# Patient Record
Sex: FEMALE | Race: WHITE | NOT HISPANIC OR LATINO | Employment: OTHER | URBAN - NONMETROPOLITAN AREA
[De-identification: names, ages, dates, MRNs, and addresses within clinical notes are randomized per-mention and may not be internally consistent; named-entity substitution may affect disease eponyms.]

---

## 2017-08-23 ENCOUNTER — HOSPITAL ENCOUNTER (OUTPATIENT)
Dept: ULTRASOUND IMAGING | Facility: HOSPITAL | Age: 62
Discharge: HOME | End: 2017-08-23
Attending: OBSTETRICS & GYNECOLOGY

## 2017-08-23 ENCOUNTER — HOSPITAL ENCOUNTER (OUTPATIENT)
Dept: MAMMOGRAPHY | Facility: HOSPITAL | Age: 62
Discharge: HOME | End: 2017-08-23
Attending: OBSTETRICS & GYNECOLOGY | Admitting: OBSTETRICS & GYNECOLOGY

## 2017-08-23 ENCOUNTER — TRANSCRIBE ORDERS (OUTPATIENT)
Dept: ADMINISTRATIVE | Facility: HOSPITAL | Age: 62
End: 2017-08-23

## 2017-08-23 DIAGNOSIS — N64.4 BREAST PAIN: ICD-10-CM

## 2017-08-23 DIAGNOSIS — N63.0 BREAST LUMP: ICD-10-CM

## 2017-08-23 DIAGNOSIS — N63.0 BREAST LUMP: Primary | ICD-10-CM

## 2017-08-23 PROCEDURE — 76642 ULTRASOUND BREAST LIMITED: CPT

## 2017-08-23 PROCEDURE — G0279 TOMOSYNTHESIS, MAMMO: HCPCS

## 2017-08-23 PROCEDURE — G0206 DX MAMMO INCL CAD UNI: HCPCS

## 2018-04-25 ENCOUNTER — DOCUMENTATION (OUTPATIENT)
Dept: CARDIOLOGY | Facility: CLINIC | Age: 63
End: 2018-04-25

## 2018-04-25 ENCOUNTER — OFFICE VISIT (OUTPATIENT)
Dept: CARDIOLOGY | Facility: CLINIC | Age: 63
End: 2018-04-25

## 2018-04-25 VITALS
WEIGHT: 169 LBS | BODY MASS INDEX: 24.2 KG/M2 | SYSTOLIC BLOOD PRESSURE: 120 MMHG | DIASTOLIC BLOOD PRESSURE: 80 MMHG | HEART RATE: 65 BPM | HEIGHT: 70 IN

## 2018-04-25 DIAGNOSIS — R00.2 PALPITATION: ICD-10-CM

## 2018-04-25 DIAGNOSIS — I10 ESSENTIAL HYPERTENSION: ICD-10-CM

## 2018-04-25 DIAGNOSIS — G89.29 CHRONIC MIDLINE LOW BACK PAIN WITHOUT SCIATICA: ICD-10-CM

## 2018-04-25 DIAGNOSIS — I77.819 AORTIC DILATATION (HCC): Primary | ICD-10-CM

## 2018-04-25 DIAGNOSIS — M54.50 CHRONIC MIDLINE LOW BACK PAIN WITHOUT SCIATICA: ICD-10-CM

## 2018-04-25 DIAGNOSIS — R00.0 TACHYCARDIA: ICD-10-CM

## 2018-04-25 DIAGNOSIS — R06.09 DOE (DYSPNEA ON EXERTION): ICD-10-CM

## 2018-04-25 PROCEDURE — 99204 OFFICE O/P NEW MOD 45 MIN: CPT | Performed by: INTERNAL MEDICINE

## 2018-04-25 PROCEDURE — 93000 ELECTROCARDIOGRAM COMPLETE: CPT | Performed by: INTERNAL MEDICINE

## 2018-04-25 RX ORDER — METOPROLOL SUCCINATE 25 MG/1
25 TABLET, EXTENDED RELEASE ORAL DAILY
Refills: 5 | COMMUNITY
Start: 2018-04-12 | End: 2018-12-18 | Stop reason: SDUPTHER

## 2018-04-25 NOTE — PROGRESS NOTES
Wanda Victoria  [id number]  1955  63 y.o.  female    Referring Provider: Micah Morton MD    Reason for Follow-up Visit:  Initial visit for evaluation for aortic root dilatation 4.2 cm [place]       Subjective     Chest pain with exertion, mild substernal, pressure like. Lasts less than 1 minute  Started 1 year ago  Occurs once or twice a week  No associated diaphoresis  No associated nausea  No radiation  Precipitated with exertion  Relieved with rest  Not positional  No change with intake of food or antacids  No change with breathing  Mild associated dyspnea    Mild chronic exertional shortness of breath on exertion relieved with rest  No significant cough or wheezing  Going on for several months  Occasional palpitations  No associated chest pain  No significant pedal edema  No fever or chills  No significant expectoration  No hemoptysis  No presyncope or syncope   Chronic low back pain        History of present illness:  Wanda Victoria is a 63 y.o. yo female with history of aortic root dilatation  who presents today for   Chief Complaint   Patient presents with   • AORTIC ROOT DILATION     NEW PT    • Chest Pain     PINCHING IN CHEST   • Fatigue   • Shortness of Breath     WITH EXCERTION    .    History  Past Medical History:   Diagnosis Date   • Hypertension    • Lyme disease    • Tachycardia    ,   Past Surgical History:   Procedure Laterality Date   • BACK SURGERY      DR MONTESINOS   • BREAST BIOPSY     •  SECTION     ,   Family History   Problem Relation Age of Onset   • Heart disease Mother    • Heart disease Father    ,   Social History   Substance Use Topics   • Smoking status: Never Smoker   • Smokeless tobacco: Never Used   • Alcohol use No   ,     Medications  Current Outpatient Prescriptions   Medication Sig Dispense Refill   • metoprolol succinate XL (TOPROL-XL) 25 MG 24 hr tablet Take 25 mg by mouth Daily.  5     No current facility-administered medications for this visit.   "      Allergies:  Sulfa antibiotics    Review of Systems  Review of Systems   Constitution: Positive for weakness and malaise/fatigue.   Cardiovascular: Positive for chest pain and dyspnea on exertion.   Musculoskeletal: Positive for arthritis and back pain.       Objective     Physical Exam:  /80   Pulse 65   Ht 177.8 cm (70\")   Wt 76.7 kg (169 lb)   BMI 24.25 kg/m²   Physical Exam   Constitutional: She appears well-developed.   HENT:   Head: Normocephalic.   Neck: Normal carotid pulses and no JVD present. No tracheal tenderness present. Carotid bruit is not present. No tracheal deviation and no edema present.   Cardiovascular: Regular rhythm, normal heart sounds and normal pulses.    Pulmonary/Chest: Effort normal. No stridor.   Abdominal: Soft.   Neurological: She is alert. She has normal strength. No cranial nerve deficit or sensory deficit.   Skin: Skin is warm.   Psychiatric: She has a normal mood and affect. Her speech is normal and behavior is normal.       Results Review:       ECG 12 Lead  Date/Time: 4/25/2018 11:26 AM  Performed by: SIMÓN BLACK  Authorized by: SIMÓN BLACK   Comparison: not compared with previous ECG   Rhythm: sinus rhythm  Rate: normal  Conduction: conduction normal  ST Segments: ST segments normal  T Waves: T waves normal  QRS axis: normal  Other: no other findings  Clinical impression: normal ECG            Assessment/Plan   Wanda was seen today for aortic root dilation, chest pain, fatigue and shortness of breath.    Diagnoses and all orders for this visit:    Aortic dilatation  -     Holter Monitor - 24 Hour; Future  -     CT Chest With Contrast; Future  -     US AAA Screen Limited; Future    Tachycardia    Essential hypertension    Palpitation    Chronic midline low back pain without sciatica    PERERA (dyspnea on exertion)  -     Adult Transthoracic Echo Complete W/ Cont if Necessary Per Protocol; Future  -     Stress Test With Myocardial Perfusion One Day; Future    Other " "orders  -     ECG 12 Lead          Plan:    Low salt/ HTN/ Heart healthy carbohydrate restricted cardiac diet as applicable to this patient's current diagnoses.   This handout has relevant information regarding shopping for food, preparing meals, what to eat at restaurants, tracking of food intake, information regarding sodium intake and salt content, how to read food labels, knowing what to eat, tips reagarding physical activity, calorie count and calorie expenditure. What foods to avoid. Information regarding alcoholic drinks along with \"good\" and \"bad\" fats.  Relevant printed educational materials given pertinent to above diagnoses      Keep LDL below 70 mg/dl. Monitor liver and renal functions.   Monitor CBC, CMP and Lipid Panel by primary    The patient is advised to reduce or avoid caffeine or other cardiac stimulants.     The patient was advised that NSAID-type medications have three  very important potential side effects: cardiovascular complications, gastrointestinal irritation including hemorrhage and renal injuries.  Do not use anti-inflammatories such as Motrin/ibuprofen, Alleve/naprosyn, Mobic and like medications Use Tylenol instead.The patient expresses understanding of these issues and questions were answered.   Monitor for any signs of bleeding including red or dark stools. Fall precautions.       Orders Placed This Encounter   Procedures   • CT Chest With Contrast     Standing Status:   Future     Standing Expiration Date:   4/25/2019   • US AAA Screen Limited     Standing Status:   Future     Standing Expiration Date:   4/25/2019     Order Specific Question:   Is the patient male between 65-75 years old?   The screening exam is only eligible for Medicare patients that are male, between 65-75 years old, have a family history of AAA or has smoked at least 100 cigarettes in their lifetime.     Answer:   No     Order Specific Question:   Reason for Exam:     Answer:   dilated aorta   • Holter Monitor " - 24 Hour     Standing Status:   Future     Standing Expiration Date:   4/25/2019     Order Specific Question:   Reason for exam?     Answer:   Palpitations   • Stress Test With Myocardial Perfusion One Day     Standing Status:   Future     Standing Expiration Date:   4/25/2019     Order Specific Question:   What stress agent will be used?     Answer:   Regadenoson (Lexiscan)     Order Specific Question:   Difficulty walking criteria?     Answer:   Musculoskeletal (hips, knees, feet, back, amputee)     Order Specific Question:   Reason for exam?     Answer:   Chest Pain   • ECG 12 Lead     This order was created via procedure documentation   • Adult Transthoracic Echo Complete W/ Cont if Necessary Per Protocol     Standing Status:   Future     Standing Expiration Date:   4/25/2019     Order Specific Question:   Reason for exam?     Answer:   Chest Pain     Order Specific Question:   Reason for exam?     Answer:   Dyspnea            Return in about 6 weeks (around 6/6/2018).

## 2018-05-17 ENCOUNTER — HOSPITAL ENCOUNTER (OUTPATIENT)
Dept: CARDIOLOGY | Facility: HOSPITAL | Age: 63
Discharge: HOME | End: 2018-05-17
Attending: INTERNAL MEDICINE

## 2018-05-17 ENCOUNTER — HOSPITAL ENCOUNTER (OUTPATIENT)
Dept: CT IMAGING | Facility: HOSPITAL | Age: 63
Discharge: HOME | End: 2018-05-17
Attending: INTERNAL MEDICINE

## 2018-05-17 ENCOUNTER — HOSPITAL ENCOUNTER (OUTPATIENT)
Dept: ULTRASOUND IMAGING | Facility: HOSPITAL | Age: 63
Discharge: HOME | End: 2018-05-17
Attending: INTERNAL MEDICINE | Admitting: INTERNAL MEDICINE

## 2018-05-17 VITALS
WEIGHT: 165 LBS | DIASTOLIC BLOOD PRESSURE: 76 MMHG | HEIGHT: 71 IN | BODY MASS INDEX: 23.1 KG/M2 | SYSTOLIC BLOOD PRESSURE: 122 MMHG | HEART RATE: 63 BPM

## 2018-05-17 DIAGNOSIS — I77.819 AORTIC DILATATION (HCC): ICD-10-CM

## 2018-05-17 DIAGNOSIS — R06.09 DOE (DYSPNEA ON EXERTION): ICD-10-CM

## 2018-05-17 LAB — CREAT BLDA-MCNC: 0.9 MG/DL (ref 0.6–1.3)

## 2018-05-17 PROCEDURE — 78452 HT MUSCLE IMAGE SPECT MULT: CPT | Performed by: INTERNAL MEDICINE

## 2018-05-17 PROCEDURE — 78452 HT MUSCLE IMAGE SPECT MULT: CPT

## 2018-05-17 PROCEDURE — 71260 CT THORAX DX C+: CPT

## 2018-05-17 PROCEDURE — 82565 ASSAY OF CREATININE: CPT

## 2018-05-17 PROCEDURE — 93018 CV STRESS TEST I&R ONLY: CPT | Performed by: INTERNAL MEDICINE

## 2018-05-17 PROCEDURE — A9500 TC99M SESTAMIBI: HCPCS | Performed by: INTERNAL MEDICINE

## 2018-05-17 PROCEDURE — 25010000002 REGADENOSON 0.4 MG/5ML SOLUTION: Performed by: INTERNAL MEDICINE

## 2018-05-17 PROCEDURE — 25010000002 IOPAMIDOL 61 % SOLUTION: Performed by: INTERNAL MEDICINE

## 2018-05-17 PROCEDURE — 76706 US ABDL AORTA SCREEN AAA: CPT

## 2018-05-17 PROCEDURE — 0 TECHNETIUM SESTAMIBI: Performed by: INTERNAL MEDICINE

## 2018-05-17 PROCEDURE — 93306 TTE W/DOPPLER COMPLETE: CPT

## 2018-05-17 PROCEDURE — 93017 CV STRESS TEST TRACING ONLY: CPT

## 2018-05-17 PROCEDURE — 93306 TTE W/DOPPLER COMPLETE: CPT | Performed by: INTERNAL MEDICINE

## 2018-05-17 RX ADMIN — TECHNETIUM TC 99M SESTAMIBI 1 DOSE: 1 INJECTION INTRAVENOUS at 09:00

## 2018-05-17 RX ADMIN — REGADENOSON 0.4 MG: 0.08 INJECTION, SOLUTION INTRAVENOUS at 10:21

## 2018-05-17 RX ADMIN — IOPAMIDOL 100 ML: 612 INJECTION, SOLUTION INTRAVENOUS at 09:10

## 2018-05-17 RX ADMIN — TECHNETIUM TC 99M SESTAMIBI 1 DOSE: 1 INJECTION INTRAVENOUS at 10:28

## 2018-05-18 LAB
BH CV STRESS BP STAGE 1: NORMAL
BH CV STRESS COMMENTS STAGE 1: NORMAL
BH CV STRESS DOSE REGADENOSON STAGE 1: 0.4
BH CV STRESS DURATION MIN STAGE 1: 0
BH CV STRESS DURATION SEC STAGE 1: 10
BH CV STRESS HR STAGE 1: 91
BH CV STRESS PROTOCOL 1: NORMAL
BH CV STRESS RECOVERY BP: NORMAL MMHG
BH CV STRESS RECOVERY HR: 72 BPM
BH CV STRESS STAGE 1: 1
LV EF NUC BP: 65 %
MAXIMAL PREDICTED HEART RATE: 157 BPM
PERCENT MAX PREDICTED HR: 57.96 %
STRESS BASELINE BP: NORMAL MMHG
STRESS BASELINE HR: 63 BPM
STRESS PERCENT HR: 68 %
STRESS POST EXERCISE DUR SEC: 10 SEC
STRESS POST PEAK BP: NORMAL MMHG
STRESS POST PEAK HR: 91 BPM
STRESS TARGET HR: 133 BPM

## 2018-05-19 LAB
BH CV ECHO MEAS - AO MAX PG (FULL): 4.6 MMHG
BH CV ECHO MEAS - AO MAX PG: 7.6 MMHG
BH CV ECHO MEAS - AO MEAN PG (FULL): 3 MMHG
BH CV ECHO MEAS - AO MEAN PG: 4 MMHG
BH CV ECHO MEAS - AO ROOT AREA (BSA CORRECTED): 2.2
BH CV ECHO MEAS - AO ROOT AREA: 13.9 CM^2
BH CV ECHO MEAS - AO ROOT DIAM: 4.2 CM
BH CV ECHO MEAS - AO V2 MAX: 138 CM/SEC
BH CV ECHO MEAS - AO V2 MEAN: 93.2 CM/SEC
BH CV ECHO MEAS - AO V2 VTI: 33.3 CM
BH CV ECHO MEAS - AVA(I,A): 2.1 CM^2
BH CV ECHO MEAS - AVA(I,D): 2.1 CM^2
BH CV ECHO MEAS - AVA(V,A): 2 CM^2
BH CV ECHO MEAS - AVA(V,D): 2 CM^2
BH CV ECHO MEAS - BSA(HAYCOCK): 2 M^2
BH CV ECHO MEAS - BSA: 1.9 M^2
BH CV ECHO MEAS - BZI_BMI: 24.2 KILOGRAMS/M^2
BH CV ECHO MEAS - BZI_METRIC_HEIGHT: 177.8 CM
BH CV ECHO MEAS - BZI_METRIC_WEIGHT: 76.7 KG
BH CV ECHO MEAS - CONTRAST EF 4CH: 60.5 ML/M^2
BH CV ECHO MEAS - EDV(CUBED): 73 ML
BH CV ECHO MEAS - EDV(MOD-SP4): 74.6 ML
BH CV ECHO MEAS - EDV(TEICH): 77.7 ML
BH CV ECHO MEAS - EF(CUBED): 69.3 %
BH CV ECHO MEAS - EF(MOD-SP4): 60.5 %
BH CV ECHO MEAS - EF(TEICH): 61.3 %
BH CV ECHO MEAS - ESV(CUBED): 22.4 ML
BH CV ECHO MEAS - ESV(MOD-SP4): 29.5 ML
BH CV ECHO MEAS - ESV(TEICH): 30.1 ML
BH CV ECHO MEAS - FS: 32.5 %
BH CV ECHO MEAS - IVS/LVPW: 1
BH CV ECHO MEAS - IVSD: 0.73 CM
BH CV ECHO MEAS - LA DIMENSION: 3 CM
BH CV ECHO MEAS - LA/AO: 0.71
BH CV ECHO MEAS - LAT PEAK E' VEL: 9 CM/SEC
BH CV ECHO MEAS - LV DIASTOLIC VOL/BSA (35-75): 38.4 ML/M^2
BH CV ECHO MEAS - LV MASS(C)D: 87.1 GRAMS
BH CV ECHO MEAS - LV MASS(C)DI: 44.8 GRAMS/M^2
BH CV ECHO MEAS - LV MAX PG: 3 MMHG
BH CV ECHO MEAS - LV MEAN PG: 1 MMHG
BH CV ECHO MEAS - LV SYSTOLIC VOL/BSA (12-30): 15.2 ML/M^2
BH CV ECHO MEAS - LV V1 MAX: 86.5 CM/SEC
BH CV ECHO MEAS - LV V1 MEAN: 55.9 CM/SEC
BH CV ECHO MEAS - LV V1 VTI: 22 CM
BH CV ECHO MEAS - LVIDD: 4.2 CM
BH CV ECHO MEAS - LVIDS: 2.8 CM
BH CV ECHO MEAS - LVLD AP4: 8 CM
BH CV ECHO MEAS - LVLS AP4: 6.8 CM
BH CV ECHO MEAS - LVOT AREA (M): 3.1 CM^2
BH CV ECHO MEAS - LVOT AREA: 3.1 CM^2
BH CV ECHO MEAS - LVOT DIAM: 2 CM
BH CV ECHO MEAS - LVPWD: 0.71 CM
BH CV ECHO MEAS - MR ALIAS VEL: 38.5 CM/SEC
BH CV ECHO MEAS - MR ERO: 0.05 CM^2
BH CV ECHO MEAS - MR FLOW RATE: 38.7 CM^3/SEC
BH CV ECHO MEAS - MR MAX PG: 219.6 MMHG
BH CV ECHO MEAS - MR MAX VEL: 741 CM/SEC
BH CV ECHO MEAS - MR MEAN PG: 143 MMHG
BH CV ECHO MEAS - MR MEAN VEL: 565 CM/SEC
BH CV ECHO MEAS - MR PISA RADIUS: 0.4 CM
BH CV ECHO MEAS - MR PISA: 1 CM^2
BH CV ECHO MEAS - MR VOLUME: 14.8 ML
BH CV ECHO MEAS - MR VTI: 284 CM
BH CV ECHO MEAS - MV A MAX VEL: 76.2 CM/SEC
BH CV ECHO MEAS - MV DEC SLOPE: 262 CM/SEC^2
BH CV ECHO MEAS - MV DEC TIME: 0.12 SEC
BH CV ECHO MEAS - MV E MAX VEL: 92.6 CM/SEC
BH CV ECHO MEAS - MV E/A: 1.2
BH CV ECHO MEAS - MV P1/2T MAX VEL: 99.5 CM/SEC
BH CV ECHO MEAS - MV P1/2T: 111.2 MSEC
BH CV ECHO MEAS - MVA P1/2T LCG: 2.2 CM^2
BH CV ECHO MEAS - MVA(P1/2T): 2 CM^2
BH CV ECHO MEAS - PI END-D VEL: 99.1 CM/SEC
BH CV ECHO MEAS - RAP SYSTOLE: 5 MMHG
BH CV ECHO MEAS - RVSP: 34.2 MMHG
BH CV ECHO MEAS - SI(AO): 237.4 ML/M^2
BH CV ECHO MEAS - SI(CUBED): 26 ML/M^2
BH CV ECHO MEAS - SI(LVOT): 35.6 ML/M^2
BH CV ECHO MEAS - SI(MOD-SP4): 23.2 ML/M^2
BH CV ECHO MEAS - SI(TEICH): 24.5 ML/M^2
BH CV ECHO MEAS - SV(AO): 461.4 ML
BH CV ECHO MEAS - SV(CUBED): 50.6 ML
BH CV ECHO MEAS - SV(LVOT): 69.1 ML
BH CV ECHO MEAS - SV(MOD-SP4): 45.1 ML
BH CV ECHO MEAS - SV(TEICH): 47.6 ML
BH CV ECHO MEAS - TR MAX VEL: 270 CM/SEC
LEFT ATRIUM VOLUME INDEX: 34.6 ML/M2
LV EF 2D ECHO EST: 55 %
MAXIMAL PREDICTED HEART RATE: 157 BPM
STRESS TARGET HR: 133 BPM

## 2018-06-12 ENCOUNTER — OFFICE VISIT (OUTPATIENT)
Dept: CARDIOLOGY | Facility: CLINIC | Age: 63
End: 2018-06-12

## 2018-06-12 VITALS — WEIGHT: 167.4 LBS | SYSTOLIC BLOOD PRESSURE: 124 MMHG | DIASTOLIC BLOOD PRESSURE: 81 MMHG | BODY MASS INDEX: 23.35 KG/M2

## 2018-06-12 DIAGNOSIS — I77.819 AORTIC DILATATION (HCC): ICD-10-CM

## 2018-06-12 DIAGNOSIS — M54.50 CHRONIC MIDLINE LOW BACK PAIN WITHOUT SCIATICA: ICD-10-CM

## 2018-06-12 DIAGNOSIS — G89.29 CHRONIC MIDLINE LOW BACK PAIN WITHOUT SCIATICA: ICD-10-CM

## 2018-06-12 DIAGNOSIS — I10 ESSENTIAL HYPERTENSION: Primary | ICD-10-CM

## 2018-06-12 DIAGNOSIS — R00.0 TACHYCARDIA: ICD-10-CM

## 2018-06-12 DIAGNOSIS — R00.2 PALPITATION: ICD-10-CM

## 2018-06-12 PROCEDURE — 93000 ELECTROCARDIOGRAM COMPLETE: CPT | Performed by: INTERNAL MEDICINE

## 2018-06-12 PROCEDURE — 99214 OFFICE O/P EST MOD 30 MIN: CPT | Performed by: INTERNAL MEDICINE

## 2018-06-12 RX ORDER — TRAMADOL HYDROCHLORIDE 50 MG/1
50 TABLET ORAL EVERY 6 HOURS PRN
Refills: 0 | COMMUNITY
Start: 2018-06-04 | End: 2020-08-10 | Stop reason: ALTCHOICE

## 2018-06-12 NOTE — PROGRESS NOTES
Wanda Victoria  [id number]  1955  63 y.o.  female    Referring Provider: Micah Morton MD    Reason for Follow-up Visit: Here for follow up after cardiac testing initial visit for evaluation for aortic root dilatation 4.2 cm [place]       Subjective    Overall feeling well   No chest pain or shortness of breath   No palpitations  No significant pedal edema  Compliant with medications and dietary advice  Good effort tolerance  No presyncope or syncope  Compliant with medications  Recent right tibial fracture  Low risk stress test with normal LVEF by echo cardiogram.   CT chest aorta 4 cm  Holter shows minor ectopy with sinus rhythm. No worrisome atrial or ventricular tachy or juan arrhythmia. No significant pause.      History of present illness:  Wanda Victoria is a 63 y.o. yo female with history of aortic root dilatation  who presents today for   Chief Complaint   Patient presents with   • Rapid Heart Rate     6 WEEK FOLLOW UP    • Palpitations   • Results   .    History  Past Medical History:   Diagnosis Date   • Asthma    • Hypertension    • Lyme disease    • Tachycardia    ,   Past Surgical History:   Procedure Laterality Date   • BACK SURGERY      DR MONTESINOS   • BREAST BIOPSY     •  SECTION     ,   Family History   Problem Relation Age of Onset   • Heart disease Mother    • Heart disease Father    ,   Social History   Substance Use Topics   • Smoking status: Never Smoker   • Smokeless tobacco: Never Used   • Alcohol use No   ,     Medications  Current Outpatient Prescriptions   Medication Sig Dispense Refill   • metoprolol succinate XL (TOPROL-XL) 25 MG 24 hr tablet Take 25 mg by mouth Daily. Patient is only taking half.  5   • traMADol (ULTRAM) 50 MG tablet Take 50 mg by mouth Every 6 (Six) Hours As Needed.  0     No current facility-administered medications for this visit.        Allergies:  Sulfa antibiotics    Review of Systems  Review of Systems   Constitution: Positive for weakness and  malaise/fatigue.   HENT: Negative.    Eyes: Negative.    Cardiovascular: Positive for dyspnea on exertion and palpitations. Negative for chest pain, claudication, cyanosis, irregular heartbeat, leg swelling, near-syncope, orthopnea, paroxysmal nocturnal dyspnea and syncope.   Respiratory: Negative.    Endocrine: Negative.    Hematologic/Lymphatic: Negative.    Skin: Negative.    Musculoskeletal: Positive for arthritis and back pain.   Gastrointestinal: Negative for anorexia.   Genitourinary: Negative.    Psychiatric/Behavioral: Negative.        Objective     Physical Exam:  /81 (BP Location: Left arm, Patient Position: Sitting, Cuff Size: Adult)   Wt 75.9 kg (167 lb 6.4 oz)   BMI 23.35 kg/m²   Physical Exam   Constitutional: She appears well-developed.   HENT:   Head: Normocephalic.   Neck: Normal carotid pulses and no JVD present. No tracheal tenderness present. Carotid bruit is not present. No tracheal deviation and no edema present.   Cardiovascular: Regular rhythm, normal heart sounds and normal pulses.    Pulmonary/Chest: Effort normal. No stridor.   Abdominal: Soft.   Neurological: She is alert. She has normal strength. No cranial nerve deficit or sensory deficit.   Skin: Skin is warm.   Psychiatric: She has a normal mood and affect. Her speech is normal and behavior is normal.       Results Review:    Results for orders placed during the hospital encounter of 05/17/18   Adult Transthoracic Echo Complete W/ Cont if Necessary Per Protocol    Narrative · Left ventricular systolic function is normal. Estimated EF = 55%.  · Left ventricular diastolic dysfunction.  · No evidence of pulmonary hypertension is present.      · Left ventricular ejection fraction is normal (Calculated EF = 65%).  · Myocardial perfusion imaging indicates a normal myocardial perfusion study with no evidence of ischemia.          Impressions are consistent with a low risk study.         · Average HR: 73. Min HR: 53. Max HR: 104.    "  · The predominant rhythm noted during the testing period was sinus rhythm.  · Premature atrial and ventricular contractions occured as below.  · Total ectopy burden during the monitoring period; PVCs:   1     and APCs: 53            · No significant supraventricular or ventricular tachy or juan arrhythmia.  · No correlated arrhythmia  · No significant pauses  · Entire report was reviewed    ECG 12 Lead  Date/Time: 6/12/2018 10:24 AM  Performed by: SIMÓN BLACK  Authorized by: SIMÓN BLACK   Comparison: compared with previous ECG from 4/25/2018  Comparison to previous ECG: atrial premature contractions   Rhythm: sinus rhythm  Ectopy: atrial premature contractions  Rate: normal  Conduction: conduction normal  ST Segments: ST segments normal  T Waves: T waves normal  QRS axis: normal  Other: no other findings  Clinical impression: abnormal ECG            Assessment/Plan   Wanda was seen today for rapid heart rate, palpitations and results.    Diagnoses and all orders for this visit:    Essential hypertension    Aortic dilatation    Palpitation    Tachycardia    Chronic midline low back pain without sciatica    Other orders  -     ECG 12 Lead          Plan:    Low salt/ HTN/ Heart healthy carbohydrate restricted cardiac diet as applicable to this patient's current diagnoses.   This handout has relevant information regarding shopping for food, preparing meals, what to eat at restaurants, tracking of food intake, information regarding sodium intake and salt content, how to read food labels, knowing what to eat, tips reagarding physical activity, calorie count and calorie expenditure. What foods to avoid. Information regarding alcoholic drinks along with \"good\" and \"bad\" fats.  Relevant printed educational materials given pertinent to above diagnoses      Keep LDL below 70 mg/dl. Monitor liver and renal functions.   Monitor CBC, CMP and Lipid Panel by primary    The patient is advised to reduce or avoid caffeine or " other cardiac stimulants.     The patient was advised that NSAID-type medications have three  very important potential side effects: cardiovascular complications, gastrointestinal irritation including hemorrhage and renal injuries.  Do not use anti-inflammatories such as Motrin/ibuprofen, Alleve/naprosyn, Mobic and like medications Use Tylenol instead.The patient expresses understanding of these issues and questions were answered.   Monitor for any signs of bleeding including red or dark stools. Fall precautions.       No additional cardiac testing required at this point in time.     Offered to give patient  a copy of my notes and relevant tests/ prior ECG etc for the patient to review and follow specific advise and relevant findings if any, prognosis, along with my current and future plans.         Return in about 6 months (around 12/12/2018).

## 2018-09-04 ENCOUNTER — TELEPHONE (OUTPATIENT)
Dept: CARDIOLOGY | Facility: CLINIC | Age: 63
End: 2018-09-04

## 2018-09-04 NOTE — TELEPHONE ENCOUNTER
Patient called and stated she had a bone scan done and it came back and it said negative 3.3 and [facility] would like her to start medication Reclast or Prolia  because she had broken her leg she is wanted to know if she should take them because she has a irregular heart beat her father and grandmother both  in aflutter.     Please advise what you think about her taking these medications she stated she doesn't want her irregular heart beat to get worse.

## 2018-09-05 NOTE — TELEPHONE ENCOUNTER
Called patient and let her know that  said it was okay to start one of the bone medications she asked about and if she had any heart related problems to give us a call.   Patient stated the understanding.

## 2018-10-11 ENCOUNTER — TRANSCRIBE ORDERS (OUTPATIENT)
Dept: ADMINISTRATIVE | Facility: HOSPITAL | Age: 63
End: 2018-10-11

## 2018-10-11 ENCOUNTER — HOSPITAL ENCOUNTER (OUTPATIENT)
Dept: MAMMOGRAPHY | Facility: HOSPITAL | Age: 63
Discharge: HOME | End: 2018-10-11
Attending: OBSTETRICS & GYNECOLOGY | Admitting: OBSTETRICS & GYNECOLOGY

## 2018-10-11 DIAGNOSIS — Z12.31 ENCOUNTER FOR SCREENING MAMMOGRAM FOR MALIGNANT NEOPLASM OF BREAST: Primary | ICD-10-CM

## 2018-10-11 DIAGNOSIS — Z12.31 ENCOUNTER FOR SCREENING MAMMOGRAM FOR MALIGNANT NEOPLASM OF BREAST: ICD-10-CM

## 2018-10-11 PROCEDURE — 77063 BREAST TOMOSYNTHESIS BI: CPT

## 2018-10-11 PROCEDURE — 77067 SCR MAMMO BI INCL CAD: CPT

## 2018-12-12 ENCOUNTER — OFFICE VISIT (OUTPATIENT)
Dept: CARDIOLOGY | Facility: CLINIC | Age: 63
End: 2018-12-12

## 2018-12-12 VITALS
DIASTOLIC BLOOD PRESSURE: 80 MMHG | HEIGHT: 71 IN | SYSTOLIC BLOOD PRESSURE: 132 MMHG | HEART RATE: 70 BPM | WEIGHT: 171 LBS | BODY MASS INDEX: 23.94 KG/M2 | OXYGEN SATURATION: 99 %

## 2018-12-12 DIAGNOSIS — R00.0 TACHYCARDIA: ICD-10-CM

## 2018-12-12 DIAGNOSIS — M54.50 CHRONIC MIDLINE LOW BACK PAIN WITHOUT SCIATICA: ICD-10-CM

## 2018-12-12 DIAGNOSIS — I10 ESSENTIAL HYPERTENSION: ICD-10-CM

## 2018-12-12 DIAGNOSIS — G89.29 CHRONIC MIDLINE LOW BACK PAIN WITHOUT SCIATICA: ICD-10-CM

## 2018-12-12 DIAGNOSIS — R00.2 PALPITATION: ICD-10-CM

## 2018-12-12 DIAGNOSIS — I77.819 AORTIC DILATATION (HCC): Primary | ICD-10-CM

## 2018-12-12 PROCEDURE — 93000 ELECTROCARDIOGRAM COMPLETE: CPT | Performed by: INTERNAL MEDICINE

## 2018-12-12 PROCEDURE — 99214 OFFICE O/P EST MOD 30 MIN: CPT | Performed by: INTERNAL MEDICINE

## 2018-12-12 NOTE — PROGRESS NOTES
Wanda Victoria  [id number]  1955  63 y.o.  female    Referring Provider: Micah Morton MD    Reason for Follow-up Visit: Here for follow up after cardiac testing initial visit for evaluation for aortic root dilatation 4.2 cm [place]   Recent RMSF says was not treated       Subjective      Similar symptoms as during last visit    Mild chronic exertional shortness of breath on exertion relieved with rest  No significant cough or wheezing  Going on for several months      Occasional palpitations, once every several weeks lasting for less than  2 minutes  Mild  dizziness, weakness,   No chest pain or shortness of breath     No associated chest pain  No significant pedal edema  No fever or chills  No significant expectoration  No hemoptysis    No presyncope or syncope   Compliant with medications  Prior right tibial fracture    Easy fatiguability         Testing    Low risk stress test with normal LVEF by echo cardiogram.   CT chest aorta 4 cm  Holter shows minor ectopy with sinus rhythm. No worrisome atrial or ventricular tachy or juan arrhythmia. No significant pause.      History of present illness:  Wanda Victoria is a 63 y.o. yo female with history of aortic root dilatation  who presents today for   Chief Complaint   Patient presents with   • Palpitations     6 MON FU    • Chest Pain     WHEN STRESSED FEELS LIKE A PINCH    .    History  Past Medical History:   Diagnosis Date   • Asthma    • Hypertension    • Lyme disease    • Tachycardia    ,   Past Surgical History:   Procedure Laterality Date   • BACK SURGERY      DR MONTESINOS   • BREAST BIOPSY Bilateral     several needle and one excisional bx   •  SECTION     ,   Family History   Problem Relation Age of Onset   • Heart disease Mother    • Heart disease Father    • Breast cancer Other    ,   Social History     Tobacco Use   • Smoking status: Never Smoker   • Smokeless tobacco: Never Used   Substance Use Topics   • Alcohol use: No   • Drug use: No   ,  "    Medications  Current Outpatient Medications   Medication Sig Dispense Refill   • Denosumab (PROLIA SC) Inject  under the skin into the appropriate area as directed.     • metoprolol succinate XL (TOPROL-XL) 25 MG 24 hr tablet Take 25 mg by mouth Daily. Patient is only taking half.  5   • traMADol (ULTRAM) 50 MG tablet Take 50 mg by mouth Every 6 (Six) Hours As Needed.  0     No current facility-administered medications for this visit.        Allergies:  Codeine and Sulfa antibiotics    Review of Systems  Review of Systems   Constitution: Positive for weakness and malaise/fatigue.   HENT: Negative.    Eyes: Negative.    Cardiovascular: Positive for dyspnea on exertion and palpitations. Negative for chest pain, claudication, cyanosis, irregular heartbeat, leg swelling, near-syncope, orthopnea, paroxysmal nocturnal dyspnea and syncope.   Respiratory: Negative.    Endocrine: Negative.    Hematologic/Lymphatic: Negative.    Skin: Negative.    Musculoskeletal: Positive for arthritis and back pain.   Gastrointestinal: Negative for anorexia.   Genitourinary: Negative.    Psychiatric/Behavioral: Negative.        Objective     Physical Exam:  /80   Pulse 70   Ht 180.3 cm (70.98\")   Wt 77.6 kg (171 lb)   SpO2 99%   BMI 23.86 kg/m²   Physical Exam   Constitutional: She appears well-developed.   HENT:   Head: Normocephalic.   Neck: Normal carotid pulses and no JVD present. No tracheal tenderness present. Carotid bruit is not present. No tracheal deviation and no edema present.   Cardiovascular: Regular rhythm, normal heart sounds and normal pulses.   Pulmonary/Chest: Effort normal. No stridor.   Abdominal: Soft.   Neurological: She is alert. She has normal strength. No cranial nerve deficit or sensory deficit.   Skin: Skin is warm.   Psychiatric: She has a normal mood and affect. Her speech is normal and behavior is normal.       Results Review:    Results for orders placed during the hospital encounter of " 05/17/18   Adult Transthoracic Echo Complete W/ Cont if Necessary Per Protocol    Narrative · Left ventricular systolic function is normal. Estimated EF = 55%.  · Left ventricular diastolic dysfunction.  · No evidence of pulmonary hypertension is present.      · Left ventricular ejection fraction is normal (Calculated EF = 65%).  · Myocardial perfusion imaging indicates a normal myocardial perfusion study with no evidence of ischemia.          Impressions are consistent with a low risk study.         · Average HR: 73. Min HR: 53. Max HR: 104.     · The predominant rhythm noted during the testing period was sinus rhythm.  · Premature atrial and ventricular contractions occured as below.  · Total ectopy burden during the monitoring period; PVCs:   1     and APCs: 53            · No significant supraventricular or ventricular tachy or juan arrhythmia.  · No correlated arrhythmia  · No significant pauses  · Entire report was reviewed    ECG 12 Lead  Date/Time: 12/12/2018 10:39 AM  Performed by: Uche Barlow MD  Authorized by: Uceh Barlow MD   Comparison: compared with previous ECG from 6/12/2018  Comparison to previous ECG: atrial premature contractions not seen  Rhythm: sinus rhythm  Rate: normal  Conduction: conduction normal  ST Segments: ST segments normal  QRS axis: normal  Other: no other findings  Clinical impression: normal ECG            Assessment/Plan   Wanda was seen today for palpitations and chest pain.    Diagnoses and all orders for this visit:    Aortic dilatation (CMS/HCC)    Essential hypertension    Palpitation    Tachycardia    Other orders  -     ECG 12 Lead            Plan        Continue same medications     No additional cardiac testing required at this point in time.      CT chest recommended   She will get this in [facility] Dr Morton  "    xxxxxxxxxxxxxxxxxxxxxxxxxxxxxxxxxxxxxxxxxxxxxxxxxxxxxxxxxxxxxxxxxxxxxxxxxxxxxxxxxxxxxxxxxxxxxxxxxxxxxxxxxxxxxxxxxxxxxxxxxxxxxxxxxxxxxxxxxxxxxxxxxxxxxxxxxxxxxxxxxxxxxxxxxxxxxxxxxxxxxxxxxxxxxxxxxxxxxxxxxxxxxxxxxxxxxxxxxxxxxxxxxxxxxxxxxxxxxxxxxxxxxxxx  Health maintenance    Low salt/ HTN/ Heart healthy carbohydrate restricted cardiac diet as applicable to this patient's current diagnoses.   This handout has relevant information regarding shopping for food, preparing meals, what to eat at restaurants, tracking of food intake, information regarding sodium intake and salt content, how to read food labels, knowing what to eat, tips reagarding physical activity, calorie count and calorie expenditure. What foods to avoid. Information regarding alcoholic drinks along with \"good\" and \"bad\" fats.  Reiterated prior recommendations     The patient is advised to reduce or avoid caffeine or other cardiac stimulants.     The patient was advised that NSAID-type medications have three  very important potential side effects: cardiovascular complications, gastrointestinal irritation including hemorrhage and renal injuries.  Do not use anti-inflammatories such as Motrin/ibuprofen, Alleve/naprosyn, Mobic and like medications Use Tylenol instead.The patient expresses understanding of these issues and questions were answered.   Monitor for any signs of bleeding including red or dark stools. Fall precautions.       Monitor for any signs of bleeding including red or dark stools. Fall precautions.   Patient is asked to monitor BP at home or work, several times per month and return with written values at next office visit.     Advised staying uptodate with immunizations per established standard guidelines.      Offered to give patient  a copy of my notes and relevant tests/ prior ECG etc for the patient to review and follow specific advise and relevant findings if any, prognosis, along with my current and future plans.      Questions were " encouraged, asked and answered to the patient's  understanding and satisfaction. Questions if any regarding current medications and side effects, need for refills and importance of compliance to medications stressed.    Reviewed available prior notes, consults, prior visits, laboratory findings, radiology and cardiology relevant reports. Updated chart as applicable. I have reviewed the patient's medical history in detail and updated the computerized patient record as relevant.      Updated patient regarding any new or relevant abnormalities on review of records or any new findings on physical exam. Mentioned to patient about purpose of visit and desirable health short and long term goals and objectives.        xxxxxxxxxxxxxxxxxxxxxxxxxxxxxxxxxxxxxxxxxxxxxxxxxxxxxxxxxxxxxxxxxxxxxxxxxxxxxxxxxxxxxxxxxxxxxxxxxxxxxxxxxxxxxxxxxxxxxxxxxxxxxxxxxxxxxxxxxxxxxxxxxxxxxxxxxxxxxxxxxxxxxxxxxxxxxxxxxxxxxxxxxxxxxxxxxxxxxxxxxxxxxxxxxxxxxxxxxxxxxxxxxxxxxxxxxxxxxxxxxxxxxxxx      Return in about 6 months (around 6/12/2019).

## 2018-12-19 RX ORDER — METOPROLOL SUCCINATE 25 MG/1
25 TABLET, EXTENDED RELEASE ORAL DAILY
Qty: 30 TABLET | Refills: 11 | Status: SHIPPED | OUTPATIENT
Start: 2018-12-19 | End: 2020-08-10 | Stop reason: ALTCHOICE

## 2019-06-12 ENCOUNTER — OFFICE VISIT (OUTPATIENT)
Dept: CARDIOLOGY | Facility: CLINIC | Age: 64
End: 2019-06-12

## 2019-06-12 VITALS
SYSTOLIC BLOOD PRESSURE: 132 MMHG | HEIGHT: 70 IN | DIASTOLIC BLOOD PRESSURE: 78 MMHG | WEIGHT: 172 LBS | HEART RATE: 88 BPM | OXYGEN SATURATION: 99 % | BODY MASS INDEX: 24.62 KG/M2

## 2019-06-12 DIAGNOSIS — R00.2 PALPITATION: ICD-10-CM

## 2019-06-12 DIAGNOSIS — R00.0 TACHYCARDIA: ICD-10-CM

## 2019-06-12 DIAGNOSIS — I10 ESSENTIAL HYPERTENSION: ICD-10-CM

## 2019-06-12 DIAGNOSIS — Z01.810 PREOP CARDIOVASCULAR EXAM: ICD-10-CM

## 2019-06-12 DIAGNOSIS — G89.29 CHRONIC MIDLINE LOW BACK PAIN WITH SCIATICA, SCIATICA LATERALITY UNSPECIFIED: Primary | ICD-10-CM

## 2019-06-12 DIAGNOSIS — M54.40 CHRONIC MIDLINE LOW BACK PAIN WITH SCIATICA, SCIATICA LATERALITY UNSPECIFIED: Primary | ICD-10-CM

## 2019-06-12 DIAGNOSIS — I77.819 AORTIC DILATATION (HCC): ICD-10-CM

## 2019-06-12 PROCEDURE — 93000 ELECTROCARDIOGRAM COMPLETE: CPT | Performed by: INTERNAL MEDICINE

## 2019-06-12 PROCEDURE — 99214 OFFICE O/P EST MOD 30 MIN: CPT | Performed by: INTERNAL MEDICINE

## 2019-06-12 RX ORDER — CYCLOBENZAPRINE HCL 10 MG
10 TABLET ORAL 3 TIMES DAILY PRN
Refills: 0 | COMMUNITY
Start: 2019-05-07 | End: 2020-08-10 | Stop reason: ALTCHOICE

## 2019-06-12 RX ORDER — ERGOCALCIFEROL (VITAMIN D2) 10 MCG
400 TABLET ORAL DAILY
COMMUNITY

## 2019-06-12 RX ORDER — MONTELUKAST SODIUM 10 MG/1
10 TABLET ORAL DAILY
Refills: 11 | COMMUNITY
Start: 2019-05-21 | End: 2020-08-10 | Stop reason: ALTCHOICE

## 2019-06-12 RX ORDER — ALBUTEROL SULFATE 90 UG/1
AEROSOL, METERED RESPIRATORY (INHALATION) AS NEEDED
COMMUNITY
Start: 2019-06-03 | End: 2020-11-18 | Stop reason: SDUPTHER

## 2019-06-12 NOTE — PROGRESS NOTES
Wanda Victoria  [id number]  1955  64 y.o.  female    Referring Provider: Micah Morton MD    Reason for Follow-up Visit: Here for routine follow up   Here for follow up after cardiac testing initial visit for evaluation for aortic root dilatation 4.2 cm    Prior  RMSF    preoperative cardiovascular clearance under general anesthesia for spine surgery Dr Montesinos    Subjective      Similar symptoms as during last visit    No new events or complaints since last visit     Awaiting spine surgery Dr Montesinos probably next year    Mild chronic exertional shortness of breath on exertion relieved with rest  No significant cough or wheezing  Going on for several months      Occasional palpitations, once every several weeks lasting for less than  2 minutes  Mild  dizziness, weakness,   No chest pain or shortness of breath     No associated chest pain  No significant pedal edema  No fever or chills  No significant expectoration  No hemoptysis    No presyncope or syncope   Compliant with medications  Prior right tibial fracture    Easy fatiguability     Holter shows minor ectopy with sinus rhythm. No worrisome atrial or ventricular tachy or juan arrhythmia. No significant pause.          History of present illness:  Wanda Victoria is a 64 y.o. yo female with history of aortic root dilatation  who presents today for   Chief Complaint   Patient presents with   • Palpitations     6 mo f/u   • AAA     4.3 cm   • Pre-op Exam     Lumbar Sx - Dr. Montesinos (procedure not yet scheduled)   .    History  Past Medical History:   Diagnosis Date   • Aneurysm (CMS/HCC)     AORTIC   • Asthma    • Hypertension    • Lyme disease    • Tachycardia    ,   Past Surgical History:   Procedure Laterality Date   • BACK SURGERY      DR MONTESINOS   • BREAST BIOPSY Bilateral     several needle and one excisional bx   •  SECTION     ,   Family History   Problem Relation Age of Onset   • Heart disease Mother    • Heart disease Father    • Breast cancer  "Other    ,   Social History     Tobacco Use   • Smoking status: Never Smoker   • Smokeless tobacco: Never Used   Substance Use Topics   • Alcohol use: No   • Drug use: No   ,     Medications  Current Outpatient Medications   Medication Sig Dispense Refill   • albuterol sulfate  (90 Base) MCG/ACT inhaler As Needed.     • Calcium Carbonate (CALCIUM 600 PO) Take  by mouth.     • cyclobenzaprine (FLEXERIL) 10 MG tablet Take 10 mg by mouth 3 (Three) Times a Day As Needed.  0   • Denosumab (PROLIA SC) Inject  under the skin into the appropriate area as directed. Every 6 months     • Ergocalciferol (VITAMIN D2) 400 units tablet Take  by mouth.     • metoprolol succinate XL (TOPROL-XL) 25 MG 24 hr tablet Take 1 tablet by mouth Daily. Patient is only taking half. 30 tablet 11   • montelukast (SINGULAIR) 10 MG tablet Take 10 mg by mouth Daily.  11   • traMADol (ULTRAM) 50 MG tablet Take 50 mg by mouth Every 6 (Six) Hours As Needed.  0     No current facility-administered medications for this visit.        Allergies:  Codeine and Sulfa antibiotics    Review of Systems  Review of Systems   Constitution: Positive for weakness and malaise/fatigue.   HENT: Negative.    Eyes: Negative.    Cardiovascular: Positive for dyspnea on exertion and palpitations. Negative for chest pain, claudication, cyanosis, irregular heartbeat, leg swelling, near-syncope, orthopnea, paroxysmal nocturnal dyspnea and syncope.   Respiratory: Negative.    Endocrine: Negative.    Hematologic/Lymphatic: Negative.    Skin: Negative.    Musculoskeletal: Positive for arthritis and back pain.   Gastrointestinal: Negative for anorexia.   Genitourinary: Negative.    Psychiatric/Behavioral: Negative.        Objective     Physical Exam:  /78   Pulse 88   Ht 177.8 cm (70\")   Wt 78 kg (172 lb)   SpO2 99%   BMI 24.68 kg/m²   Physical Exam   Constitutional: She appears well-developed.   HENT:   Head: Normocephalic.   Neck: Normal carotid pulses and no " JVD present. No tracheal tenderness present. Carotid bruit is not present. No tracheal deviation and no edema present.   Cardiovascular: Regular rhythm, normal heart sounds and normal pulses.   Pulmonary/Chest: Effort normal. No stridor.   Abdominal: Soft.   Neurological: She is alert. She has normal strength. No cranial nerve deficit or sensory deficit.   Skin: Skin is warm.   Psychiatric: She has a normal mood and affect. Her speech is normal and behavior is normal.       Results Review:    Results for orders placed during the hospital encounter of 05/17/18   Adult Transthoracic Echo Complete W/ Cont if Necessary Per Protocol    Narrative · Left ventricular systolic function is normal. Estimated EF = 55%.  · Left ventricular diastolic dysfunction.  · No evidence of pulmonary hypertension is present.      · Left ventricular ejection fraction is normal (Calculated EF = 65%).  · Myocardial perfusion imaging indicates a normal myocardial perfusion study with no evidence of ischemia.          Impressions are consistent with a low risk study.         · Average HR: 73. Min HR: 53. Max HR: 104.     · The predominant rhythm noted during the testing period was sinus rhythm.  · Premature atrial and ventricular contractions occured as below.  · Total ectopy burden during the monitoring period; PVCs:   1     and APCs: 53            · No significant supraventricular or ventricular tachy or juan arrhythmia.  · No correlated arrhythmia  · No significant pauses  · Entire report was reviewed      5/18      IMPRESSION:  1. Fusiform aneurysmal dilation of the ascending thoracic aorta up to  4.0 cm. No dissection or pseudoaneurysm.  2. The caliber becomes normal at the level of the arch with normal  caliber of the descending thoracic aorta.         FINDINGS:     The proximal aorta measures 2.0 x 1.7 cm. The mid aorta measures 1.7 x  1.8 cm. The distal aorta measures 1.6 x 1.5 cm. The aortoiliac  bifurcation is normal in appearance.      IMPRESSION:  1. Normal sonographic appearance of the abdominal aorta.        ECG 12 Lead  Date/Time: 6/12/2019 11:06 AM  Performed by: Uche Barlow MD  Authorized by: Uche Barlow MD   Comparison: compared with previous ECG from 12/12/2018  Similar to previous ECG  Rhythm: sinus rhythm  Rate: normal  Conduction: conduction normal  ST Segments: ST segments normal  T Waves: T waves normal  QRS axis: normal  Other: no other findings    Clinical impression: normal ECG            Assessment/Plan   Wanda was seen today for palpitations, aaa and pre-op exam.    Diagnoses and all orders for this visit:    Chronic midline low back pain without sciatica    Essential hypertension    Palpitation    Aortic dilatation (CMS/HCC)  -     CT Chest With Contrast; Future    Tachycardia    Preop cardiovascular exam Dr Ha spine surgery    Other orders  -     ECG 12 Lead           Plan      Acceptable cardiovascular risk of planned procedure.  Can proceed with surgery with usual caution and perioperative hemodynamic and cardiac rhythm monitoring.     Repeat CT chest     Orders Placed This Encounter   Procedures   • CT Chest With Contrast     Standing Status:   Future     Standing Expiration Date:   6/12/2020   • ECG 12 Lead     This order was created via procedure documentation        Continue beta blocker therapy   ____________________________________________________________________________________________________________________________________________  Health maintenance and recommendations      Similar recommendations as last visit     Advised staying uptodate with immunizations per established standard guidelines.      Offered to give patient  a copy      Questions were encouraged, asked and answered to the patient's  understanding and satisfaction. Questions if any regarding current medications and side effects, need for refills and importance of compliance to medications stressed.    Reviewed available prior notes, consults,  prior visits, laboratory findings, radiology and cardiology relevant reports. Updated chart as applicable. I have reviewed the patient's medical history in detail and updated the computerized patient record as relevant.      Updated patient regarding any new or relevant abnormalities on review of records or any new findings on physical exam. Mentioned to patient about purpose of visit and desirable health short and long term goals and objectives.    Primary to monitor CBC CMP Lipid panel and TSH as applicable    ___________________________________________________________________________________________________________________________________________          Return in about 9 months (around 3/12/2020).

## 2019-06-20 ENCOUNTER — HOSPITAL ENCOUNTER (OUTPATIENT)
Dept: CT IMAGING | Facility: HOSPITAL | Age: 64
Discharge: HOME | End: 2019-06-20
Admitting: INTERNAL MEDICINE

## 2019-06-20 DIAGNOSIS — I77.819 AORTIC DILATATION (HCC): ICD-10-CM

## 2019-06-20 LAB — CREAT BLDA-MCNC: 0.7 MG/DL (ref 0.6–1.3)

## 2019-06-20 PROCEDURE — 71260 CT THORAX DX C+: CPT

## 2019-06-20 PROCEDURE — 82565 ASSAY OF CREATININE: CPT

## 2019-06-20 PROCEDURE — 25010000002 IOPAMIDOL 61 % SOLUTION: Performed by: INTERNAL MEDICINE

## 2019-06-20 RX ADMIN — IOPAMIDOL 100 ML: 612 INJECTION, SOLUTION INTRAVENOUS at 10:30

## 2019-06-26 ENCOUNTER — TELEPHONE (OUTPATIENT)
Dept: CARDIOLOGY | Facility: CLINIC | Age: 64
End: 2019-06-26

## 2019-07-01 NOTE — TELEPHONE ENCOUNTER
IMPRESSION:  1. Stable 4 cm ascending aorta.  2. No acute cardiopulmonary process.  3. Cholelithiasis.    Sure, can get CT results

## 2020-05-12 ENCOUNTER — TRANSCRIBE ORDERS (OUTPATIENT)
Dept: ADMINISTRATIVE | Facility: HOSPITAL | Age: 65
End: 2020-05-12

## 2020-05-12 DIAGNOSIS — R06.02 SHORTNESS OF BREATH: ICD-10-CM

## 2020-05-12 DIAGNOSIS — Z12.31 ENCOUNTER FOR SCREENING MAMMOGRAM FOR MALIGNANT NEOPLASM OF BREAST: Primary | ICD-10-CM

## 2020-05-12 DIAGNOSIS — R07.9 CHEST PAIN, UNSPECIFIED TYPE: ICD-10-CM

## 2020-05-14 ENCOUNTER — HOSPITAL ENCOUNTER (OUTPATIENT)
Dept: CT IMAGING | Facility: HOSPITAL | Age: 65
Discharge: HOME | End: 2020-05-14

## 2020-05-14 ENCOUNTER — HOSPITAL ENCOUNTER (OUTPATIENT)
Dept: MAMMOGRAPHY | Facility: HOSPITAL | Age: 65
Discharge: HOME | End: 2020-05-14

## 2020-05-14 DIAGNOSIS — R07.9 CHEST PAIN, UNSPECIFIED TYPE: ICD-10-CM

## 2020-05-14 DIAGNOSIS — Z12.31 ENCOUNTER FOR SCREENING MAMMOGRAM FOR MALIGNANT NEOPLASM OF BREAST: ICD-10-CM

## 2020-05-14 DIAGNOSIS — R06.02 SHORTNESS OF BREATH: ICD-10-CM

## 2020-05-15 ENCOUNTER — HOSPITAL ENCOUNTER (OUTPATIENT)
Dept: CARDIOLOGY | Facility: HOSPITAL | Age: 65
Discharge: HOME | End: 2020-05-15
Admitting: INTERNAL MEDICINE

## 2020-05-15 ENCOUNTER — TRANSCRIBE ORDERS (OUTPATIENT)
Dept: ADMINISTRATIVE | Facility: HOSPITAL | Age: 65
End: 2020-05-15

## 2020-05-15 ENCOUNTER — HOSPITAL ENCOUNTER (OUTPATIENT)
Dept: CT IMAGING | Facility: HOSPITAL | Age: 65
Discharge: HOME | End: 2020-05-15

## 2020-05-15 VITALS
HEIGHT: 70 IN | WEIGHT: 171.96 LBS | DIASTOLIC BLOOD PRESSURE: 78 MMHG | BODY MASS INDEX: 24.62 KG/M2 | SYSTOLIC BLOOD PRESSURE: 132 MMHG

## 2020-05-15 DIAGNOSIS — R06.02 SHORTNESS OF BREATH: ICD-10-CM

## 2020-05-15 DIAGNOSIS — R07.9 CHEST PAIN, UNSPECIFIED TYPE: ICD-10-CM

## 2020-05-15 DIAGNOSIS — N64.59 OTHER SIGNS AND SYMPTOMS IN BREAST: ICD-10-CM

## 2020-05-15 DIAGNOSIS — R06.02 SHORTNESS OF BREATH: Primary | ICD-10-CM

## 2020-05-15 DIAGNOSIS — N63.0 BREAST NODULE: ICD-10-CM

## 2020-05-15 PROCEDURE — 93306 TTE W/DOPPLER COMPLETE: CPT

## 2020-05-15 PROCEDURE — 0 IOPAMIDOL PER 1 ML: Performed by: INTERNAL MEDICINE

## 2020-05-15 PROCEDURE — 71275 CT ANGIOGRAPHY CHEST: CPT

## 2020-05-15 PROCEDURE — 93306 TTE W/DOPPLER COMPLETE: CPT | Performed by: INTERNAL MEDICINE

## 2020-05-15 PROCEDURE — 82565 ASSAY OF CREATININE: CPT

## 2020-05-15 RX ADMIN — IOPAMIDOL 100 ML: 755 INJECTION, SOLUTION INTRAVENOUS at 13:57

## 2020-05-16 LAB
BH CV ECHO MEAS - AO MAX PG (FULL): 2.4 MMHG
BH CV ECHO MEAS - AO MAX PG: 5.9 MMHG
BH CV ECHO MEAS - AO MEAN PG (FULL): 2 MMHG
BH CV ECHO MEAS - AO MEAN PG: 4 MMHG
BH CV ECHO MEAS - AO ROOT AREA (BSA CORRECTED): 1.9
BH CV ECHO MEAS - AO ROOT AREA: 10.8 CM^2
BH CV ECHO MEAS - AO ROOT DIAM: 3.7 CM
BH CV ECHO MEAS - AO V2 MAX: 121 CM/SEC
BH CV ECHO MEAS - AO V2 MEAN: 101 CM/SEC
BH CV ECHO MEAS - AO V2 VTI: 27.8 CM
BH CV ECHO MEAS - AVA(I,A): 1.9 CM^2
BH CV ECHO MEAS - AVA(I,D): 1.9 CM^2
BH CV ECHO MEAS - AVA(V,A): 2.4 CM^2
BH CV ECHO MEAS - AVA(V,D): 2.4 CM^2
BH CV ECHO MEAS - BSA(HAYCOCK): 2 M^2
BH CV ECHO MEAS - BSA: 2 M^2
BH CV ECHO MEAS - BZI_BMI: 24.7 KILOGRAMS/M^2
BH CV ECHO MEAS - BZI_METRIC_HEIGHT: 177.8 CM
BH CV ECHO MEAS - BZI_METRIC_WEIGHT: 78 KG
BH CV ECHO MEAS - EDV(CUBED): 37.3 ML
BH CV ECHO MEAS - EDV(MOD-SP4): 31.8 ML
BH CV ECHO MEAS - EDV(TEICH): 45.4 ML
BH CV ECHO MEAS - EF(MOD-SP4): 51.6 %
BH CV ECHO MEAS - ESV(MOD-SP4): 15.4 ML
BH CV ECHO MEAS - IVS/LVPW: 0.7
BH CV ECHO MEAS - IVSD: 0.56 CM
BH CV ECHO MEAS - LA DIMENSION: 2.2 CM
BH CV ECHO MEAS - LA/AO: 0.59
BH CV ECHO MEAS - LAT PEAK E' VEL: 6.8 CM/SEC
BH CV ECHO MEAS - LV DIASTOLIC VOL/BSA (35-75): 16.2 ML/M^2
BH CV ECHO MEAS - LV MASS(C)D: 55.9 GRAMS
BH CV ECHO MEAS - LV MASS(C)DI: 28.6 GRAMS/M^2
BH CV ECHO MEAS - LV MAX PG: 3.4 MMHG
BH CV ECHO MEAS - LV MEAN PG: 2 MMHG
BH CV ECHO MEAS - LV SYSTOLIC VOL/BSA (12-30): 7.9 ML/M^2
BH CV ECHO MEAS - LV V1 MAX: 92.4 CM/SEC
BH CV ECHO MEAS - LV V1 MEAN: 68 CM/SEC
BH CV ECHO MEAS - LV V1 VTI: 17.1 CM
BH CV ECHO MEAS - LVIDD: 3.3 CM
BH CV ECHO MEAS - LVLD AP4: 7.4 CM
BH CV ECHO MEAS - LVLS AP4: 6 CM
BH CV ECHO MEAS - LVOT AREA (M): 3.1 CM^2
BH CV ECHO MEAS - LVOT AREA: 3.1 CM^2
BH CV ECHO MEAS - LVOT DIAM: 2 CM
BH CV ECHO MEAS - LVPWD: 0.8 CM
BH CV ECHO MEAS - MED PEAK E' VEL: 7.6 CM/SEC
BH CV ECHO MEAS - MR MAX PG: 122.3 MMHG
BH CV ECHO MEAS - MR MAX VEL: 553 CM/SEC
BH CV ECHO MEAS - MR MEAN PG: 92 MMHG
BH CV ECHO MEAS - MR MEAN VEL: 460 CM/SEC
BH CV ECHO MEAS - MR VTI: 147 CM
BH CV ECHO MEAS - MV A MAX VEL: 67.7 CM/SEC
BH CV ECHO MEAS - MV DEC SLOPE: 227 CM/SEC^2
BH CV ECHO MEAS - MV DEC TIME: 0.25 SEC
BH CV ECHO MEAS - MV E MAX VEL: 56.6 CM/SEC
BH CV ECHO MEAS - MV E/A: 0.84
BH CV ECHO MEAS - RAP SYSTOLE: 10 MMHG
BH CV ECHO MEAS - RVSP: 29.4 MMHG
BH CV ECHO MEAS - SI(AO): 152.7 ML/M^2
BH CV ECHO MEAS - SI(LVOT): 27.4 ML/M^2
BH CV ECHO MEAS - SI(MOD-SP4): 8.4 ML/M^2
BH CV ECHO MEAS - SV(AO): 298.9 ML
BH CV ECHO MEAS - SV(LVOT): 53.7 ML
BH CV ECHO MEAS - SV(MOD-SP4): 16.4 ML
BH CV ECHO MEAS - TR MAX VEL: 220 CM/SEC
BH CV ECHO MEASUREMENTS AVERAGE E/E' RATIO: 7.86
LEFT ATRIUM VOLUME INDEX: 5.9 ML/M2
LEFT ATRIUM VOLUME: 11.5 CM3
LV EF 2D ECHO EST: 55 %
MAXIMAL PREDICTED HEART RATE: 155 BPM
STRESS TARGET HR: 132 BPM

## 2020-05-18 ENCOUNTER — HOSPITAL ENCOUNTER (OUTPATIENT)
Dept: MAMMOGRAPHY | Facility: HOSPITAL | Age: 65
Discharge: HOME | End: 2020-05-18
Admitting: INTERNAL MEDICINE

## 2020-05-18 ENCOUNTER — HOSPITAL ENCOUNTER (OUTPATIENT)
Dept: ULTRASOUND IMAGING | Facility: HOSPITAL | Age: 65
Discharge: HOME | End: 2020-05-18

## 2020-05-18 DIAGNOSIS — N64.59 OTHER SIGNS AND SYMPTOMS IN BREAST: ICD-10-CM

## 2020-05-18 DIAGNOSIS — N63.0 BREAST NODULE: ICD-10-CM

## 2020-05-18 LAB — CREAT BLDA-MCNC: 0.9 MG/DL (ref 0.6–1.3)

## 2020-05-18 PROCEDURE — 76642 ULTRASOUND BREAST LIMITED: CPT

## 2020-05-18 PROCEDURE — 77066 DX MAMMO INCL CAD BI: CPT

## 2020-05-18 PROCEDURE — G0279 TOMOSYNTHESIS, MAMMO: HCPCS

## 2020-08-01 PROBLEM — R06.02 SHORTNESS OF BREATH: Status: ACTIVE | Noted: 2020-08-01

## 2020-08-01 PROBLEM — R91.8 GROUND GLASS OPACITY PRESENT ON IMAGING OF LUNG: Status: ACTIVE | Noted: 2020-08-01

## 2020-08-09 NOTE — PROGRESS NOTES
Subjective   Wanda Victoria is a 65 y.o. female.     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  hx aortic root dilatation. extensive ground glass infiltrates 2020    Chief Complaint   Patient presents with   • Cough     No hospitalization; No exposure; tested 3 times- Negative   • Shortness of Breath        History of Present Illness   This is a patient whom Dr. Morton has asked me to see.  She reports having had a history of [place] spotted fever and Lyme disease endometriosis on multiple occasions.  She blames some of her trouble on that.  She reports constant shortness of breath in the chest aggravated by any exertion aggravated by alleviated by rest and associated with a weight loss of 30 pounds over the past several months since last fall.  She has albuterol neb treatments which have been of modest help.  She reports having whooping cough as a child and a touch of asthma since that time.  To pulmonary function tests but reported being too full to complete the test.  Her last x-ray was in May of this year.  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  Radiographs were done suggesting emphysema and bibasilar fibrosis and extensive groundglass traits were seen on CT imaging during that time.  She was treated for pneumonia.  She is better than she was admitted and is not anywhere as well as she was prior to last fall.  No other exposures or other changes.  Records from Dr. Jeffries were reviewed indicating identification of his infiltrates and respiratory symptoms with plans for pulmonary function testing.    Medical/Family/Social History   has a past medical history of Aneurysm (CMS/HCC), Asthma, Hypertension, Lyme disease, and Tachycardia.   has a past surgical history that includes Back surgery;  section; and Breast biopsy (Bilateral).  family history  includes Breast cancer in an other family member; Heart disease in her father and mother.   reports that she has never smoked. She has never used smokeless tobacco. She reports that she does not drink alcohol or use drugs.  Allergies   Allergen Reactions   • Codeine Itching   • Cephalosporins Rash, Other (See Comments) and Dizziness     Throat tightening and thought she was having a stroke   • Sulfa Antibiotics Rash     Medications    Current Outpatient Medications:   •  albuterol (PROVENTIL) (2.5 MG/3ML) 0.083% nebulizer solution, Take 2.5 mg by nebulization Every 4 (Four) Hours As Needed for Wheezing., Disp: , Rfl:   •  albuterol sulfate  (90 Base) MCG/ACT inhaler, As Needed., Disp: , Rfl:   •  Calcium Carbonate (CALCIUM 600 PO), Take  by mouth., Disp: , Rfl:   •  Ergocalciferol (VITAMIN D2) 400 units tablet, Take  by mouth., Disp: , Rfl:   •  ibuprofen (ADVIL,MOTRIN) 400 MG tablet, Take 800 mg by mouth Every 6 (Six) Hours As Needed for Mild Pain ., Disp: , Rfl:   •  Multiple Vitamins-Minerals (MULTIVITAMIN WITH MINERALS) tablet tablet, Take 1 tablet by mouth Daily., Disp: , Rfl:   •  albuterol sulfate  (90 Base) MCG/ACT inhaler, Inhale 2 puffs Every 4 (Four) Hours As Needed for Wheezing or Shortness of Air for up to 30 days. Dispense formulary preferred brand, Disp: 1 inhaler, Rfl: 11  •  budesonide-formoterol (Symbicort) 160-4.5 MCG/ACT inhaler, Inhale 2 puffs 2 (Two) Times a Day for 30 days., Disp: 2 inhaler, Rfl: 0  •  budesonide-formoterol (Symbicort) 160-4.5 MCG/ACT inhaler, Inhale 2 puffs 2 (Two) Times a Day for 90 days., Disp: 3 inhaler, Rfl: 3    Review of Systems   Constitutional: Positive for fatigue and unexpected weight loss. Negative for chills and fever.   HENT: Negative for trouble swallowing and voice change.    Eyes: Negative for photophobia and visual disturbance.   Respiratory: Positive for shortness of breath.    Cardiovascular: Negative for chest pain.   Gastrointestinal:  "Negative for abdominal pain, nausea and vomiting.   Genitourinary: Negative for difficulty urinating and hematuria.   Musculoskeletal: Negative for gait problem and joint swelling.   Skin: Negative for rash.   Neurological: Negative for tremors, weakness and confusion.   Hematological: Negative for adenopathy. Does not bruise/bleed easily.       Objective   /70   Pulse 92   Temp 97.7 °F (36.5 °C)   Ht 177.8 cm (70\")   Wt 64.4 kg (142 lb)   SpO2 98% Comment: RA  Breastfeeding No   BMI 20.37 kg/m²   Physical Exam   Constitutional: She appears well-developed. She does not appear ill. No distress.   HENT:   Head: Atraumatic.   Eyes: Conjunctivae and EOM are normal. No scleral icterus.   Neck: Neck supple.   Cardiovascular: Normal rate, regular rhythm, S1 normal and S2 normal.   Pulmonary/Chest: Effort normal. Stridor (upper fields) present. She has wheezes. Rales: upper fields.   Abdominal: Soft. She exhibits no distension. There is no tenderness.   Musculoskeletal: She exhibits no deformity.   Lymphadenopathy:     She has no cervical adenopathy.   Neurological: She is alert.   Skin: Skin is warm. No rash noted.   Psychiatric: She has a normal mood and affect.     -----------------------------------------------------------------------------------------------  EXAMINATION: CT ANGIOGRAM CHEST-      5/15/2020 1:55 PM CDT     HISTORY: SHORTNESS OF BREATH; R06.02-Shortness of breath     In order to have a CT radiation dose as low as reasonably achievable  Automated Exposure Control was utilized for adjustment of the mA and/or  KV according to patient size.     DLP in mGycm= 304.     CT angiography protocol.   CT imaging with bolus IV contrast injection.   Under concurrent supervision axial, sagittal, coronal, and  three-dimensional data sets were constructed.     Comparison is made with 6/20/2019.     Normal heart size.  No thoracic aortic dissection.     There is mild stable prominence of the ascending aorta " with diameter of  41 x 41 mm.  No mediastinal mass.     Symmetric well opacified pulmonary arteries.  No pulmonary embolism.     Patchy groundglass infiltrate seen diffusely throughout both lungs.  Viral pneumonia could have this appearance.     There is no pneumothorax or heart failure.  No pleural effusion.     Summary:  1. No pulmonary embolism.  2. Patchy groundglass infiltrates throughout both lungs.    My interpretation: ground glass densities, mosaic pattern.     -----------------------------------------------------------------------------------------------         -----------------------------------------------------------------------------------------------  Assessment/Plan   Problem List Items Addressed This Visit        Pulmonary Problems    Shortness of breath - Primary    Relevant Medications    albuterol sulfate  (90 Base) MCG/ACT inhaler    budesonide-formoterol (Symbicort) 160-4.5 MCG/ACT inhaler    Other Relevant Orders    XR Chest 2 View       Other    Ground glass opacity present on imaging of lung      Other Visit Diagnoses     Centrilobular emphysema (CMS/HCC)        Relevant Medications    albuterol (PROVENTIL) (2.5 MG/3ML) 0.083% nebulizer solution    budesonide-formoterol (Symbicort) 160-4.5 MCG/ACT inhaler    albuterol sulfate  (90 Base) MCG/ACT inhaler    budesonide-formoterol (Symbicort) 160-4.5 MCG/ACT inhaler        Patient's Body mass index is 20.37 kg/m². BMI is within normal parameters. No follow-up required..      Patient has had a multitude of medical issues.  She has had infectious disorders and pulmonary symptoms that have developed over the past several months.  Were not sure a few pulmonary function and were not relieved position to be able to measure that very well right now with the pandemic and also considering doing the maneuvers not long ago.  She does appear to be backslash at baseline situation.  We will give her a trial of Symbicort and I gave her a sample  of this, and also start her rescue inhaler.  She can still use the nebs if she needs to at home.  These will give her some portability for rescue type medication.  Hopefully the inhaled steroid will help against any pulmonary inflammation causing bronchospasm.  She has occasional exposures she has had a possible viral illness in any of these could aggravate bronchospasm in someone with a prior history of mild asthma.  She does have some mosaic attenuation on her chest CT and I would worry about regional air trapping and so pulmonary function testing will help a lot in that matter.  We will get a chest x-ray to follow-up today to try to confirm that nothing progressive is going on Friday inhalers and give her time to get a little bit better and then get a pulmonary function test.  She will call if she deteriorates.       Electronically signed by Braydon Talavera MD, 8/10/2020, 12:30

## 2020-08-10 ENCOUNTER — HOSPITAL ENCOUNTER (OUTPATIENT)
Dept: GENERAL RADIOLOGY | Facility: HOSPITAL | Age: 65
Discharge: HOME | End: 2020-08-10
Admitting: INTERNAL MEDICINE

## 2020-08-10 ENCOUNTER — OFFICE VISIT (OUTPATIENT)
Dept: PULMONOLOGY | Facility: CLINIC | Age: 65
End: 2020-08-10

## 2020-08-10 VITALS
OXYGEN SATURATION: 98 % | TEMPERATURE: 97.7 F | HEIGHT: 70 IN | SYSTOLIC BLOOD PRESSURE: 150 MMHG | BODY MASS INDEX: 20.33 KG/M2 | HEART RATE: 92 BPM | DIASTOLIC BLOOD PRESSURE: 70 MMHG | WEIGHT: 142 LBS

## 2020-08-10 DIAGNOSIS — R06.02 SHORTNESS OF BREATH: ICD-10-CM

## 2020-08-10 DIAGNOSIS — J43.2 CENTRILOBULAR EMPHYSEMA (HCC): ICD-10-CM

## 2020-08-10 DIAGNOSIS — R06.02 SHORTNESS OF BREATH: Primary | ICD-10-CM

## 2020-08-10 DIAGNOSIS — R91.8 GROUND GLASS OPACITY PRESENT ON IMAGING OF LUNG: ICD-10-CM

## 2020-08-10 PROCEDURE — 71046 X-RAY EXAM CHEST 2 VIEWS: CPT

## 2020-08-10 PROCEDURE — 99204 OFFICE O/P NEW MOD 45 MIN: CPT | Performed by: INTERNAL MEDICINE

## 2020-08-10 RX ORDER — MULTIPLE VITAMINS W/ MINERALS TAB 9MG-400MCG
1 TAB ORAL DAILY
COMMUNITY

## 2020-08-10 RX ORDER — ALBUTEROL SULFATE 2.5 MG/3ML
2.5 SOLUTION RESPIRATORY (INHALATION) EVERY 4 HOURS PRN
COMMUNITY
End: 2020-11-12

## 2020-08-10 RX ORDER — IBUPROFEN 400 MG/1
800 TABLET ORAL EVERY 6 HOURS PRN
COMMUNITY

## 2020-08-10 RX ORDER — BUDESONIDE AND FORMOTEROL FUMARATE DIHYDRATE 160; 4.5 UG/1; UG/1
2 AEROSOL RESPIRATORY (INHALATION) 2 TIMES DAILY
Qty: 3 INHALER | Refills: 3 | Status: SHIPPED | OUTPATIENT
Start: 2020-08-10 | End: 2020-11-18 | Stop reason: SDUPTHER

## 2020-08-10 RX ORDER — ALBUTEROL SULFATE 90 UG/1
2 AEROSOL, METERED RESPIRATORY (INHALATION) EVERY 4 HOURS PRN
Qty: 1 INHALER | Refills: 11 | Status: SHIPPED | OUTPATIENT
Start: 2020-08-10 | End: 2020-09-09

## 2020-08-10 RX ORDER — BUDESONIDE AND FORMOTEROL FUMARATE DIHYDRATE 160; 4.5 UG/1; UG/1
2 AEROSOL RESPIRATORY (INHALATION)
Qty: 2 INHALER | Refills: 0 | COMMUNITY
Start: 2020-08-10 | End: 2020-11-18 | Stop reason: SDUPTHER

## 2020-08-10 NOTE — PROGRESS NOTES
Let pt know this looked ok.  Some chronic changes. Nothing else to do for now.  Keep follow up as planned

## 2020-08-10 NOTE — PATIENT INSTRUCTIONS
Budesonide; Formoterol Inhalation  What is this medicine?  BUDESONIDE; FORMOTEROL (byoo GERA oh nide; for MOH mundo vani) inhalation is a combination of 2 medicines that decrease inflammation and help to open up the airways in your lungs. It is used to treat asthma. It is also used to treat chronic obstructive pulmonary disease (COPD), including chronic bronchitis or emphysema. Do NOT use for an acute asthma or COPD attack.  This medicine may be used for other purposes; ask your health care provider or pharmacist if you have questions.  COMMON BRAND NAME(S): Symbicort  What should I tell my health care provider before I take this medicine?  They need to know if you have any of these conditions:  · bone problems  · diabetes  · heart disease or irregular heartbeat  · high blood pressure  · immune system problems  · infection  · liver disease  · worsening asthma  · an unusual or allergic reaction to budesonide, formoterol, medicines, foods, dyes, or preservatives  · pregnant or trying to get pregnant  · breast-feeding  How should I use this medicine?  This medicine is inhaled through the mouth. Rinse your mouth with water after use. Make sure not to swallow the water. Follow the directions on your prescription label. Do not use more often than directed. Do not stop taking except on your doctor's advice. Make sure that you are using your inhaler correctly. Ask your doctor or health care provider if you have any questions.  A special MedGuide will be given to you by the pharmacist with each prescription and refill. Be sure to read this information carefully each time.  Talk to your pediatrician regarding the use of this medicine in children. While this drug may be prescribed for children as young as 6 years of age for selected conditions, precautions do apply.  Overdosage: If you think you have taken too much of this medicine contact a poison control center or emergency room at once.  NOTE: This medicine is only for you. Do  not share this medicine with others.  What if I miss a dose?  If you miss a dose, use it as soon as you remember. If it is almost time for your next dose, use only that dose and continue with your regular schedule, spacing doses evenly. Do not use double or extra doses.  What may interact with this medicine?  Do not take this medicine with any of the following medications:  · MAOIs like Carbex, Eldepryl, Marplan, Nardil, and Parnate  · mifepristone  · probucol  · procarbazine  · some other medicines for asthma like formoterol, salmeterol  This medicine may also interact with the following medications:  · antibiotics like clarithromycin, erythromycin  · cimetidine  · diuretics  · grapefruit juice  · itraconazole  · ketoconazole  · medicines for depression, anxiety, or psychotic disturbances  · medicines for irregular heartbeat  · methadone  · some heart medicines like atenolol, metoprolol  · some other medicines for breathing problems  · some vaccines  This list may not describe all possible interactions. Give your health care provider a list of all the medicines, herbs, non-prescription drugs, or dietary supplements you use. Also tell them if you smoke, drink alcohol, or use illegal drugs. Some items may interact with your medicine.  What should I watch for while using this medicine?  Tell your doctor or health care professional if your symptoms do not improve or get worse. Do not use this medicine more than every 12 hours.  NEVER use this medicine for an acute asthma or COPD attack. You should use your short-acting rescue inhalers for this purpose. If your symptoms get worse or if you need your short-acting inhalers more often, call your doctor right away.  This medicine may increase your risk of getting an infection. Tell your doctor or health care professional if you are around anyone with measles or chickenpox, or if you develop sores or blisters that do not heal properly.  What side effects may I notice from  receiving this medicine?  Side effects that you should report to your doctor or health care professional as soon as possible:  · allergic reactions such as skin rash or itching, hives, swelling of the face, lips or tongue  · breathing problems  · changes in vision  · chest pain  · fast, irregular heartbeat  · feeling faint or lightheaded, falls  · fever  · high blood pressure  · nervousness  · tremors  · white patches or sores in mouth  Side effects that usually do not require medical attention (report to your doctor or health care professional if they continue or are bothersome):  · cough  · different taste in mouth  · headache  · sore throat  · stuffy nose  · stomach upset  This list may not describe all possible side effects. Call your doctor for medical advice about side effects. You may report side effects to FDA at 2-721-FDA-8630.  Where should I keep my medicine?  Keep out of the reach of children.  Store in a dry place at room temperature between 20 and 25 degrees C (68 and 77 degrees F). Do not get the inhaler wet. Keep track of the number of doses used. Throw away the inhaler after using the marked number of inhalations or after the expiration date, whichever comes first. Do not burn or puncture canister.  NOTE: This sheet is a summary. It may not cover all possible information. If you have questions about this medicine, talk to your doctor, pharmacist, or health care provider.  © 2020 [employer] (2017-12-21 15:25:18)

## 2020-11-12 ENCOUNTER — CONSULT (OUTPATIENT)
Dept: ONCOLOGY | Facility: CLINIC | Age: 65
End: 2020-11-12

## 2020-11-12 ENCOUNTER — LAB (OUTPATIENT)
Dept: LAB | Facility: HOSPITAL | Age: 65
End: 2020-11-12

## 2020-11-12 VITALS
OXYGEN SATURATION: 96 % | HEIGHT: 70 IN | TEMPERATURE: 97.1 F | HEART RATE: 76 BPM | WEIGHT: 143.7 LBS | DIASTOLIC BLOOD PRESSURE: 82 MMHG | SYSTOLIC BLOOD PRESSURE: 148 MMHG | RESPIRATION RATE: 18 BRPM | BODY MASS INDEX: 20.57 KG/M2

## 2020-11-12 DIAGNOSIS — D72.828 OTHER ELEVATED WHITE BLOOD CELL (WBC) COUNT: Primary | ICD-10-CM

## 2020-11-12 DIAGNOSIS — D72.828 OTHER ELEVATED WHITE BLOOD CELL (WBC) COUNT: ICD-10-CM

## 2020-11-12 LAB
ALBUMIN SERPL-MCNC: 4.7 G/DL (ref 3.5–5.2)
ALBUMIN/GLOB SERPL: 1.6 G/DL
ALP SERPL-CCNC: 83 U/L (ref 39–117)
ALT SERPL W P-5'-P-CCNC: 20 U/L (ref 1–33)
ANION GAP SERPL CALCULATED.3IONS-SCNC: 9 MMOL/L (ref 5–15)
AST SERPL-CCNC: 29 U/L (ref 1–32)
BASOPHILS # BLD AUTO: 0.05 10*3/MM3 (ref 0–0.2)
BASOPHILS NFR BLD AUTO: 0.6 % (ref 0–1.5)
BILIRUB SERPL-MCNC: 0.5 MG/DL (ref 0–1.2)
BUN SERPL-MCNC: 14 MG/DL (ref 8–23)
BUN/CREAT SERPL: 18.9 (ref 7–25)
CALCIUM SPEC-SCNC: 9.8 MG/DL (ref 8.6–10.5)
CHLORIDE SERPL-SCNC: 106 MMOL/L (ref 98–107)
CO2 SERPL-SCNC: 26 MMOL/L (ref 22–29)
CREAT SERPL-MCNC: 0.74 MG/DL (ref 0.57–1)
CYTOLOGIST CVX/VAG CYTO: NORMAL
DEPRECATED RDW RBC AUTO: 45.1 FL (ref 37–54)
EOSINOPHIL # BLD AUTO: 0.1 10*3/MM3 (ref 0–0.4)
EOSINOPHIL NFR BLD AUTO: 1.2 % (ref 0.3–6.2)
ERYTHROCYTE [DISTWIDTH] IN BLOOD BY AUTOMATED COUNT: 14.3 % (ref 12.3–15.4)
ERYTHROCYTE [SEDIMENTATION RATE] IN BLOOD: 11 MM/HR (ref 0–20)
GFR SERPL CREATININE-BSD FRML MDRD: 79 ML/MIN/1.73
GLOBULIN UR ELPH-MCNC: 3 GM/DL
GLUCOSE SERPL-MCNC: 122 MG/DL (ref 65–99)
HCT VFR BLD AUTO: 46.7 % (ref 34–46.6)
HGB BLD-MCNC: 14.8 G/DL (ref 12–15.9)
IMM GRANULOCYTES # BLD AUTO: 0.02 10*3/MM3 (ref 0–0.05)
IMM GRANULOCYTES NFR BLD AUTO: 0.2 % (ref 0–0.5)
LDH SERPL-CCNC: 219 U/L (ref 135–214)
LYMPHOCYTES # BLD AUTO: 2.28 10*3/MM3 (ref 0.7–3.1)
LYMPHOCYTES NFR BLD AUTO: 28 % (ref 19.6–45.3)
MCH RBC QN AUTO: 27.3 PG (ref 26.6–33)
MCHC RBC AUTO-ENTMCNC: 31.7 G/DL (ref 31.5–35.7)
MCV RBC AUTO: 86.2 FL (ref 79–97)
MONOCYTES # BLD AUTO: 0.58 10*3/MM3 (ref 0.1–0.9)
MONOCYTES NFR BLD AUTO: 7.1 % (ref 5–12)
NEUTROPHILS NFR BLD AUTO: 5.1 10*3/MM3 (ref 1.7–7)
NEUTROPHILS NFR BLD AUTO: 62.9 % (ref 42.7–76)
NRBC BLD AUTO-RTO: 0 /100 WBC (ref 0–0.2)
PATH INTERP BLD-IMP: NORMAL
PLATELET # BLD AUTO: 324 10*3/MM3 (ref 140–450)
PMV BLD AUTO: 9.7 FL (ref 6–12)
POTASSIUM SERPL-SCNC: 4.5 MMOL/L (ref 3.5–5.2)
PROT SERPL-MCNC: 7.7 G/DL (ref 6–8.5)
RBC # BLD AUTO: 5.42 10*6/MM3 (ref 3.77–5.28)
SODIUM SERPL-SCNC: 141 MMOL/L (ref 136–145)
WBC # BLD AUTO: 8.13 10*3/MM3 (ref 3.4–10.8)

## 2020-11-12 PROCEDURE — 85651 RBC SED RATE NONAUTOMATED: CPT

## 2020-11-12 PROCEDURE — 83615 LACTATE (LD) (LDH) ENZYME: CPT

## 2020-11-12 PROCEDURE — 36415 COLL VENOUS BLD VENIPUNCTURE: CPT

## 2020-11-12 PROCEDURE — 85060 BLOOD SMEAR INTERPRETATION: CPT

## 2020-11-12 PROCEDURE — 82668 ASSAY OF ERYTHROPOIETIN: CPT

## 2020-11-12 PROCEDURE — 99204 OFFICE O/P NEW MOD 45 MIN: CPT | Performed by: INTERNAL MEDICINE

## 2020-11-12 PROCEDURE — 85025 COMPLETE CBC W/AUTO DIFF WBC: CPT

## 2020-11-12 PROCEDURE — 80053 COMPREHEN METABOLIC PANEL: CPT
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
## 2020-11-13 LAB — EPO SERPL-ACNC: 13.7 MIU/ML (ref 2.6–18.5)

## 2020-11-16 NOTE — PROGRESS NOTES
ELADIA Ivey  [facility]   Respiratory Disease Clinic  [address] [place] [address]  Phone: [phone]  Fax: [phone]     Wanda Victoria is a 65 y.o. female.   CC:   Chief Complaint   Patient presents with   • Shortness of Breath        HPI:  Ms. Victoria is a pleasant 65 year old female patient of Dr. Braydon Talavera with known centrolobular emphysema, basilar fibrosis, aortic root dilation, hx of demi mountain spotted fever, lyme disease. She is a nurse as well as rescues animals. She had a CXR that showed chronic interstitial changes and no acute changes. She was given Symbicort and rescue inhaler. She states the Symbicort has helped a lot. She has used the nebulizer 4-5 days ago. The rescue inhaler a couple of days ago. She states about every 3 days she will need to use one or the other. She attributes this to going out into the early cold air to feed her rescue animals.     Patient denies fever, chills, cough, shortness of breath or difficulty breathing, fatigue, muscle or body aches, new onset headache, new loss of taste or smell, sore throat, congestion or runny nose, nausea or vomiting, diarrhea.  Patient denies any known exposure to a person under investigation or positive for COVID-19.  Patient is wearing mask today.       The following portions of the patient's history were reviewed and updated as appropriate: allergies, current medications, past family history, past medical history, past social history, past surgical history and problem list.    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    • Hypertension    • Lyme disease    • Mitral valve regurgitation    • Mitral valve stenosis    • Mixed connective tissue disease (CMS/HCC)    • Mononucleosis    • Osteoarthritis    • Rheumatoid arthritis (CMS/HCC)   "  • RMSF (Palomo Mountain spotted fever)    • Shingles    • Tachycardia        Family History   Problem Relation Age of Onset   • Heart disease Mother    • Heart disease Father    • Breast cancer Other    • No Known Problems Sister    • Cancer Half-Brother         GBM   • Cancer Half-Brother         Gastric cancer   • Other Sister          at 3 days old from brain bleed   • No Known Problems Son    • No Known Problems Son    • No Known Problems Son    • No Known Problems Son        Social History     Socioeconomic History   • Marital status:      Spouse name: Not on file   • Number of children: Not on file   • Years of education: Not on file   • Highest education level: Not on file   Tobacco Use   • Smoking status: Never Smoker   • Smokeless tobacco: Never Used   Substance and Sexual Activity   • Alcohol use: No   • Drug use: No   • Sexual activity: Defer       Review of Systems   Constitutional: Negative for chills, diaphoresis, fatigue and fever.   HENT: Negative for congestion, rhinorrhea and trouble swallowing.    Eyes: Negative for blurred vision, double vision and visual disturbance.   Respiratory: Positive for cough and shortness of breath. Negative for wheezing.    Cardiovascular: Negative for chest pain, palpitations and leg swelling.   Gastrointestinal: Negative for abdominal distention, abdominal pain and nausea.   Endocrine: Negative for cold intolerance, heat intolerance and polydipsia.   Musculoskeletal: Negative for back pain, gait problem and myalgias.   Skin: Negative for color change, pallor and rash.   Neurological: Negative for dizziness, syncope and confusion.   Hematological: Negative for adenopathy. Does not bruise/bleed easily.   Psychiatric/Behavioral: Negative for agitation, behavioral problems and sleep disturbance.       /80   Pulse 72   Temp 99 °F (37.2 °C)   Ht 177.8 cm (70\")   Wt 64.5 kg (142 lb 3.2 oz)   SpO2 99%   BMI 20.40 kg/m²     Physical " Exam  Constitutional:       General: She is not in acute distress.     Appearance: She is well-developed. She is not diaphoretic.   HENT:      Head: Normocephalic and atraumatic.   Eyes:      Conjunctiva/sclera: Conjunctivae normal.      Pupils: Pupils are equal, round, and reactive to light.   Neck:      Musculoskeletal: Normal range of motion and neck supple.   Cardiovascular:      Rate and Rhythm: Normal rate and regular rhythm.      Heart sounds: Normal heart sounds. No murmur. No friction rub. No gallop.    Pulmonary:      Effort: Pulmonary effort is normal. No respiratory distress.      Breath sounds: Normal breath sounds. No wheezing.   Abdominal:      General: Bowel sounds are normal.      Palpations: Abdomen is soft.   Musculoskeletal: Normal range of motion.         General: No deformity.   Lymphadenopathy:      Cervical: No cervical adenopathy.   Skin:     General: Skin is warm and dry.      Findings: No erythema.   Neurological:      Mental Status: She is alert and oriented to person, place, and time.   Psychiatric:         Behavior: Behavior normal.         Thought Content: Thought content normal.         Judgment: Judgment normal.         Pulmonary Functions Testing Results:      No results found for this or any previous visit.    My interpretation: none     CXR: 08/10/20  FINDINGS:   Upright frontal and lateral radiographs of the chest were obtained.     Chronic interstitial changes present in the pulmonary parenchyma. No  obvious infiltrates are identified.. Cardiac silhouette is normal.  Vascular calcifications present in the aortic arch.. The osseous  structures and surrounding soft tissues demonstrate no acute  abnormality.     IMPRESSION:  1. No radiographic evidence of acute cardiopulmonary process.        This report was finalized on 08/10/2020 12:30 by Dr. Guero Holcomb MD.      Problem List Items Addressed This Visit     None      Visit Diagnoses     Centrilobular emphysema (CMS/HCC)    -   Primary    Relevant Medications    albuterol sulfate  (90 Base) MCG/ACT inhaler    budesonide-formoterol (SYMBICORT) 160-4.5 MCG/ACT inhaler    levalbuterol (XOPENEX) 0.63 MG/3ML nebulizer solution    PERERA (dyspnea on exertion)        Mild intermittent asthma without complication        Relevant Medications    albuterol sulfate  (90 Base) MCG/ACT inhaler    budesonide-formoterol (SYMBICORT) 160-4.5 MCG/ACT inhaler    levalbuterol (XOPENEX) 0.63 MG/3ML nebulizer solution        Patient's Body mass index is 20.4 kg/m². BMI is within normal parameters. No follow-up required..      Assessment/Plan     Overall she is stable. She will continue her current medications. I have sent refills to the pharmacy. Follow up in 6 months with Dr. Sadaf Walter, APRN  11/18/2020  10:59 CST    Return in about 6 months (around 5/18/2021) for Dr. Talavera.    This dictation was generated by voice recognition computer software. Although all attempts are made to edit dictation for accuracy, there may be errors in the transcription that are not intended.

## 2020-11-18 ENCOUNTER — OFFICE VISIT (OUTPATIENT)
Dept: PULMONOLOGY | Facility: CLINIC | Age: 65
End: 2020-11-18

## 2020-11-18 ENCOUNTER — TELEPHONE (OUTPATIENT)
Dept: PULMONOLOGY | Facility: CLINIC | Age: 65
End: 2020-11-18

## 2020-11-18 VITALS
HEART RATE: 72 BPM | HEIGHT: 70 IN | WEIGHT: 142.2 LBS | OXYGEN SATURATION: 99 % | DIASTOLIC BLOOD PRESSURE: 80 MMHG | BODY MASS INDEX: 20.36 KG/M2 | TEMPERATURE: 99 F | SYSTOLIC BLOOD PRESSURE: 132 MMHG

## 2020-11-18 DIAGNOSIS — R06.09 DOE (DYSPNEA ON EXERTION): ICD-10-CM

## 2020-11-18 DIAGNOSIS — J43.2 CENTRILOBULAR EMPHYSEMA (HCC): Primary | ICD-10-CM

## 2020-11-18 DIAGNOSIS — J45.20 MILD INTERMITTENT ASTHMA WITHOUT COMPLICATION: ICD-10-CM

## 2020-11-18 PROCEDURE — 99213 OFFICE O/P EST LOW 20 MIN: CPT | Performed by: NURSE PRACTITIONER

## 2020-11-18 RX ORDER — BUDESONIDE AND FORMOTEROL FUMARATE DIHYDRATE 160; 4.5 UG/1; UG/1
2 AEROSOL RESPIRATORY (INHALATION)
Qty: 1 INHALER | Refills: 3 | Status: SHIPPED | OUTPATIENT
Start: 2020-11-18 | End: 2021-07-07 | Stop reason: SDUPTHER

## 2020-11-18 RX ORDER — LEVALBUTEROL INHALATION SOLUTION 0.63 MG/3ML
1 SOLUTION RESPIRATORY (INHALATION) EVERY 4 HOURS PRN
COMMUNITY
End: 2020-11-18 | Stop reason: SDUPTHER

## 2020-11-18 RX ORDER — CYCLOBENZAPRINE HCL 10 MG
10 TABLET ORAL AS NEEDED
COMMUNITY
Start: 2020-08-31 | End: 2021-07-20

## 2020-11-18 RX ORDER — BUDESONIDE AND FORMOTEROL FUMARATE DIHYDRATE 160; 4.5 UG/1; UG/1
2 AEROSOL RESPIRATORY (INHALATION)
COMMUNITY
End: 2020-11-18 | Stop reason: SDUPTHER

## 2020-11-18 RX ORDER — ALBUTEROL SULFATE 90 UG/1
2 AEROSOL, METERED RESPIRATORY (INHALATION) EVERY 6 HOURS PRN
Qty: 18 G | Refills: 3 | Status: SHIPPED | OUTPATIENT
Start: 2020-11-18 | End: 2021-07-07 | Stop reason: SDUPTHER

## 2020-11-18 RX ORDER — LEVALBUTEROL INHALATION SOLUTION 0.63 MG/3ML
1 SOLUTION RESPIRATORY (INHALATION) EVERY 4 HOURS PRN
Qty: 1 AMPULE | Refills: 6 | Status: SHIPPED | OUTPATIENT
Start: 2020-11-18

## 2020-11-18 NOTE — TELEPHONE ENCOUNTER
Pharmacy called asking for a quantity on the Levalbuterol that was sent over today.  Gave a verbal of 120 ampules with 5 refills.

## 2020-11-20 ENCOUNTER — HOSPITAL ENCOUNTER (OUTPATIENT)
Dept: ULTRASOUND IMAGING | Facility: HOSPITAL | Age: 65
Discharge: HOME | End: 2020-11-20
Admitting: INTERNAL MEDICINE

## 2020-11-20 DIAGNOSIS — D72.828 OTHER ELEVATED WHITE BLOOD CELL (WBC) COUNT: ICD-10-CM

## 2020-11-20 PROCEDURE — 76700 US EXAM ABDOM COMPLETE: CPT

## 2020-11-24 ENCOUNTER — TELEMEDICINE (OUTPATIENT)
Dept: ONCOLOGY | Facility: CLINIC | Age: 65
End: 2020-11-24

## 2020-11-24 DIAGNOSIS — D75.1 ERYTHROCYTOSIS: Primary | ICD-10-CM

## 2020-11-24 PROCEDURE — 99442 PR PHYS/QHP TELEPHONE EVALUATION 11-20 MIN: CPT | Performed by: INTERNAL MEDICINE

## 2021-04-29 ENCOUNTER — OFFICE VISIT (OUTPATIENT)
Dept: GASTROENTEROLOGY | Facility: CLINIC | Age: 66
End: 2021-04-29

## 2021-04-29 VITALS
BODY MASS INDEX: 19.88 KG/M2 | OXYGEN SATURATION: 98 % | DIASTOLIC BLOOD PRESSURE: 84 MMHG | WEIGHT: 142 LBS | SYSTOLIC BLOOD PRESSURE: 126 MMHG | HEART RATE: 81 BPM | HEIGHT: 71 IN | TEMPERATURE: 96.8 F

## 2021-04-29 DIAGNOSIS — K59.09 CHRONIC CONSTIPATION: ICD-10-CM

## 2021-04-29 DIAGNOSIS — R10.12 LEFT UPPER QUADRANT ABDOMINAL PAIN: Primary | ICD-10-CM

## 2021-04-29 DIAGNOSIS — R11.2 NAUSEA AND VOMITING, INTRACTABILITY OF VOMITING NOT SPECIFIED, UNSPECIFIED VOMITING TYPE: ICD-10-CM

## 2021-04-29 PROCEDURE — 99214 OFFICE O/P EST MOD 30 MIN: CPT | Performed by: NURSE PRACTITIONER

## 2021-04-29 RX ORDER — ONDANSETRON 4 MG/1
4 TABLET, FILM COATED ORAL EVERY 8 HOURS PRN
COMMUNITY
End: 2021-09-02
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
## 2021-05-18 ENCOUNTER — TRANSCRIBE ORDERS (OUTPATIENT)
Dept: ADMINISTRATIVE | Facility: HOSPITAL | Age: 66
End: 2021-05-18

## 2021-05-18 DIAGNOSIS — M54.50 LOW BACK PAIN, UNSPECIFIED BACK PAIN LATERALITY, UNSPECIFIED CHRONICITY, UNSPECIFIED WHETHER SCIATICA PRESENT: Primary | ICD-10-CM

## 2021-06-02 ENCOUNTER — HOSPITAL ENCOUNTER (OUTPATIENT)
Dept: MRI IMAGING | Facility: HOSPITAL | Age: 66
Discharge: HOME | End: 2021-06-02
Admitting: INTERNAL MEDICINE

## 2021-06-02 PROCEDURE — 72148 MRI LUMBAR SPINE W/O DYE: CPT

## 2021-07-02 NOTE — PROGRESS NOTES
"Chief Complaint  Centrilobular emphysema (about the same per pt, weather can make her SOA, has wheezing when laying flat)    Subjective    History of Present Illness      Wanda Victoria presents to [facility] PULMONARY & CRITICAL CARE MEDICINE   Ms. Victoria is a pleasant 65 year old female patient of Dr. Braydon Talavera with known centrolobular emphysema, basilar fibrosis, aortic root dilation, hx of demi mountain spotted fever, lyme disease. She is using Symbicort and rescue inhaler and finds benefit. She states about a month ago she was painting/ spakeling/ sanding sheet rock and did not use a mask. She did have problems with increased shortness of breath at that time. She had to use her nebulizer for several days.         Objective   Vital Signs:   /84   Pulse 70   Ht 180.3 cm (71\")   Wt 64.5 kg (142 lb 3.2 oz)   SpO2 95%   BMI 19.83 kg/m²     Physical Exam  Vitals reviewed.   Constitutional:       Appearance: Normal appearance.   Cardiovascular:      Rate and Rhythm: Normal rate and regular rhythm.   Pulmonary:      Effort: Pulmonary effort is normal.      Breath sounds: Normal breath sounds.   Neurological:      General: No focal deficit present.      Mental Status: She is alert and oriented to person, place, and time.   Psychiatric:         Mood and Affect: Mood normal.         Behavior: Behavior normal.          Result Review :   The following data was reviewed by: ELADIA Ivey on 07/07/2021:    My interpretation of imaging:  No new   My interpretation of labs: non new      My interpretation of the PFT: no new     No results found for this or any previous visit.    Patient's BMI 19.83. BMI is within normal parameters. No follow-up required..    Assessment and Plan    Diagnoses and all orders for this visit:    1. Mild persistent asthma without complication (Primary)    2. Centrilobular emphysema (CMS/HCC)    Other orders  -     budesonide-formoterol (SYMBICORT) " 160-4.5 MCG/ACT inhaler; Inhale 2 puffs 2 (Two) Times a Day.  Dispense: 1 each; Refill: 5  -     albuterol sulfate  (90 Base) MCG/ACT inhaler; Inhale 2 puffs Every 6 (Six) Hours As Needed for Wheezing or Shortness of Air.  Dispense: 18 g; Refill: 5      She has concerns about second hand smoke with her spouse. I do not have a hand out specifically on second hand smoke but she is given a hand out on smoking. She is advised she should avoid second hand smoke if at all possible.     Health maintenance:   Influenza vaccine: Oct 2020  Pneumoccocal: Oct 2020       Follow Up   Return in about 6 months (around 1/7/2022) for PFT.  Patient was given instructions and counseling regarding her condition or for health maintenance advice. Please see specific information pulled into the AVS if appropriate.     Steff Walter, APRMARYCARMEN  7/8/2021  11:34 CDT

## 2021-07-07 ENCOUNTER — OFFICE VISIT (OUTPATIENT)
Dept: PULMONOLOGY | Facility: CLINIC | Age: 66
End: 2021-07-07

## 2021-07-07 VITALS
DIASTOLIC BLOOD PRESSURE: 84 MMHG | HEART RATE: 70 BPM | OXYGEN SATURATION: 95 % | HEIGHT: 71 IN | SYSTOLIC BLOOD PRESSURE: 138 MMHG | BODY MASS INDEX: 19.91 KG/M2 | WEIGHT: 142.2 LBS

## 2021-07-07 DIAGNOSIS — J45.30 MILD PERSISTENT ASTHMA WITHOUT COMPLICATION: Primary | Chronic | ICD-10-CM

## 2021-07-07 DIAGNOSIS — J43.2 CENTRILOBULAR EMPHYSEMA (HCC): Chronic | ICD-10-CM

## 2021-07-07 PROCEDURE — 99214 OFFICE O/P EST MOD 30 MIN: CPT | Performed by: NURSE PRACTITIONER

## 2021-07-07 RX ORDER — FAMOTIDINE 20 MG/1
TABLET, FILM COATED ORAL
COMMUNITY
Start: 2021-02-15 | End: 2021-09-02

## 2021-07-07 RX ORDER — ALBUTEROL SULFATE 90 UG/1
2 AEROSOL, METERED RESPIRATORY (INHALATION) EVERY 6 HOURS PRN
Qty: 18 G | Refills: 5 | Status: SHIPPED | OUTPATIENT
Start: 2021-07-07

## 2021-07-07 RX ORDER — BUDESONIDE AND FORMOTEROL FUMARATE DIHYDRATE 160; 4.5 UG/1; UG/1
2 AEROSOL RESPIRATORY (INHALATION)
Qty: 1 EACH | Refills: 5 | Status: SHIPPED | OUTPATIENT
Start: 2021-07-07

## 2021-07-07 RX ORDER — ONDANSETRON 4 MG/1
TABLET, ORALLY DISINTEGRATING ORAL
COMMUNITY
Start: 2021-04-09 | End: 2021-09-02

## 2021-07-19 ENCOUNTER — TELEPHONE (OUTPATIENT)
Dept: NEUROSURGERY | Facility: CLINIC | Age: 66
End: 2021-07-19

## 2021-07-19 NOTE — TELEPHONE ENCOUNTER
Called patient and left message for a call back to confirm her appointment in the office for 07/20/2021

## 2021-07-20 ENCOUNTER — HOSPITAL ENCOUNTER (OUTPATIENT)
Dept: GENERAL RADIOLOGY | Facility: HOSPITAL | Age: 66
Discharge: HOME | End: 2021-07-20
Admitting: NURSE PRACTITIONER

## 2021-07-20 ENCOUNTER — OFFICE VISIT (OUTPATIENT)
Dept: NEUROSURGERY | Facility: CLINIC | Age: 66
End: 2021-07-20

## 2021-07-20 VITALS
WEIGHT: 143.4 LBS | BODY MASS INDEX: 20.08 KG/M2 | SYSTOLIC BLOOD PRESSURE: 140 MMHG | DIASTOLIC BLOOD PRESSURE: 82 MMHG | HEIGHT: 71 IN

## 2021-07-20 DIAGNOSIS — M51.37 DEGENERATION OF LUMBAR OR LUMBOSACRAL INTERVERTEBRAL DISC: ICD-10-CM

## 2021-07-20 DIAGNOSIS — M51.37 DEGENERATION OF LUMBAR OR LUMBOSACRAL INTERVERTEBRAL DISC: Primary | ICD-10-CM

## 2021-07-20 DIAGNOSIS — M41.20 SCOLIOSIS (AND KYPHOSCOLIOSIS), IDIOPATHIC: ICD-10-CM

## 2021-07-20 DIAGNOSIS — Z78.9 NON-SMOKER: ICD-10-CM

## 2021-07-20 PROBLEM — M51.379 DEGENERATION OF LUMBAR OR LUMBOSACRAL INTERVERTEBRAL DISC: Status: ACTIVE | Noted: 2021-07-20

## 2021-07-20 PROCEDURE — 72114 X-RAY EXAM L-S SPINE BENDING: CPT

## 2021-07-20 PROCEDURE — 99214 OFFICE O/P EST MOD 30 MIN: CPT | Performed by: NURSE PRACTITIONER

## 2021-07-20 PROCEDURE — 72082 X-RAY EXAM ENTIRE SPI 2/3 VW: CPT

## 2021-07-20 RX ORDER — CYCLOBENZAPRINE HCL 10 MG
10 TABLET ORAL 3 TIMES DAILY PRN
Qty: 90 TABLET | Refills: 2 | Status: SHIPPED | OUTPATIENT
Start: 2021-07-20
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
## 2021-07-20 NOTE — PATIENT INSTRUCTIONS
Advance Care Planning and Advance Directives     You make decisions on a daily basis - decisions about where you want to live, your career, your home, your life. Perhaps one of the most important decisions you face is your choice for future medical care. Take time to talk with your family and your healthcare team and start planning today.  Advance Care Planning is a process that can help you:  · Understand possible future healthcare decisions in light of your own experiences  · Reflect on those decision in light of your goals and values  · Discuss your decisions with those closest to you and the healthcare professionals that care for you  · Make a plan by creating a document that reflects your wishes    Surrogate Decision Maker  In the event of a medical emergency, which has left you unable to communicate or to make your own decisions, you would need someone to make decisions for you.  It is important to discuss your preferences for medical treatment with this person while you are in good health.     Qualities of a surrogate decision maker:  • Willing to take on this role and responsibility  • Knows what you want for future medical care  • Willing to follow your wishes even if they don't agree with them  • Able to make difficult medical decisions under stressful circumstances    Advance Directives  These are legal documents you can create that will guide your healthcare team and decision maker(s) when needed. These documents can be stored in the electronic medical record.    · Living Will - a legal document to guide your care if you have a terminal condition or a serious illness and are unable to communicate. States vary by statute in document names/types, but most forms may include one or more of the following:        -  Directions regarding life-prolonging treatments        -  Directions regarding artificially provided nutrition/hydration        -  Choosing a healthcare decision maker        -  Direction  regarding organ/tissue donation    · Durable Power of  for Healthcare - this document names an -in-fact to make medical decisions for you, but it may also allow this person to make personal and financial decisions for you. Please seek the advice of an  if you need this type of document.    **Advance Directives are not required and no one may discriminate against you if you do not sign one.    Medical Orders  Many states allow specific forms/orders signed by your physician to record your wishes for medical treatment in your current state of health. This form, signed in personal communication with your physician, addresses resuscitation and other medical interventions that you may or may not want.      For more information or to schedule a time with a [facility] Advance Care Planning Facilitator contact: [email or web address] or call [phone] and someone will contact you directly.

## 2021-08-24 ENCOUNTER — TELEPHONE (OUTPATIENT)
Dept: NEUROSURGERY | Facility: CLINIC | Age: 66
End: 2021-08-24

## 2021-09-02 ENCOUNTER — OFFICE VISIT (OUTPATIENT)
Dept: NEUROSURGERY | Facility: CLINIC | Age: 66
End: 2021-09-02

## 2021-09-02 VITALS — WEIGHT: 144.4 LBS | BODY MASS INDEX: 20.22 KG/M2 | HEIGHT: 71 IN

## 2021-09-02 DIAGNOSIS — M54.16 LUMBAR RADICULOPATHY: ICD-10-CM

## 2021-09-02 DIAGNOSIS — M41.20 SCOLIOSIS (AND KYPHOSCOLIOSIS), IDIOPATHIC: ICD-10-CM

## 2021-09-02 DIAGNOSIS — M51.37 DEGENERATION OF LUMBAR OR LUMBOSACRAL INTERVERTEBRAL DISC: Primary | ICD-10-CM

## 2021-09-02 DIAGNOSIS — M25.552 LEFT HIP PAIN: ICD-10-CM

## 2021-09-02 DIAGNOSIS — Z78.9 NON-SMOKER: ICD-10-CM

## 2021-09-02 PROCEDURE — 99213 OFFICE O/P EST LOW 20 MIN: CPT | Performed by: NEUROLOGICAL SURGERY

## 2021-09-02 NOTE — PROGRESS NOTES
SUBJECTIVE:  Patient ID: Wanda Victoria is a 66 y.o. female is here today for follow-up.    Chief Complaint: Back pain  Chief Complaint   Patient presents with   • BACK & LEG PAIN     patient is here for follow up after completing 8 sessions of therapy without a lasting improvement in her symptoms; patient had imagign @ [facility]; patient did have surgery by Dr Ha in 2014 with good results x 4 years.       HPI  66-year-old female admitted for back pain and left lower extremity radicular pain.  Her biggest complaint by far is the left leg pain.  Is claudicant in nature.  She describes pain that involves her low back and then radiates to the left hip down the anterior portion of the thigh past the knee.  Its associated with numbness and tingling also.  She is done 9 sessions of physical therapy without any improvement.  She is used muscle relaxers.  She has not done any injection treatments.  We plan to send her for additional imaging she is here to discuss the results.    The following portions of the patient's history were reviewed and updated as appropriate: allergies, current medications, past family history, past medical history, past social history, past surgical history and problem list.    OBJECTIVE:    Review of Systems   Musculoskeletal: Positive for back pain.   All other systems reviewed and are negative.         Physical Exam  Eyes:      Extraocular Movements: EOM normal.      Pupils: Pupils are equal, round, and reactive to light.   Neurological:      Mental Status: She is oriented to person, place, and time.      Coordination: Finger-Nose-Finger Test normal.      Gait: Gait is intact.      Deep Tendon Reflexes: Strength normal.      Reflex Scores:       Patellar reflexes are 2+ on the right side and 0 on the left side.       Achilles reflexes are 1+ on the right side and 0 on the left side.  Psychiatric:         Speech: Speech normal.         Neurologic Exam     Mental Status   Oriented to person, place,  and time.   Attention: normal.   Speech: speech is normal   Level of consciousness: alert  Knowledge: good.     Cranial Nerves     CN II   Visual fields full to confrontation.     CN III, IV, VI   Pupils are equal, round, and reactive to light.  Extraocular motions are normal.     CN V   Facial sensation intact.     CN VII   Facial expression full, symmetric.     CN VIII   CN VIII normal.     CN IX, X   CN IX normal.   CN X normal.     CN XI   CN XI normal.     CN XII   CN XII normal.     Motor Exam   Muscle bulk: normal  Overall muscle tone: normal  Right arm pronator drift: absent  Left arm pronator drift: absent    Strength   Strength 5/5 throughout.     Sensory Exam   Light touch normal.   Pinprick normal.     Gait, Coordination, and Reflexes     Gait  Gait: normal    Coordination   Finger to nose coordination: normal    Tremor   Resting tremor: absent  Intention tremor: absent  Action tremor: absent    Reflexes   Reflexes 2+ except as noted.   Right patellar: 2+  Left patellar: 0  Right achilles: 1+  Left achilles: 0Obvious coronal scoliotic deformity with a pelvic tilt and shoulder tilt       Independent Review of Radiographic Studies:       ASSESSMENT/PLAN:  She had a two-level laminectomy in 2014 inside a scoliotic deformity.  This is resulted in progression of the scoliotic deformity with severe degenerative disc disease mostly involving L3-4 and 4 5.  There is significant leftward narrowing causing her left leg pain.  She is gone a dedicated course of physical therapy without any improvement.  We discussed 2 options.  The first would be a series of injections in her low back to more aggressively treat her symptoms.  The second would be a multilevel lumbar fusion and deformity correction to address the severe degenerative disc disease and degenerative scoliosis.  I explained her that this would be rather involved operation likely multiple operations with a prolonged recovery.  She is going to consider  whether she would like to pursue injections next or talking more detail about the operation.      No diagnosis found.    The patient's Body mass index is 20.43 kg/m².. BMI is within normal parameters. No follow-up required.    No follow-ups on file.      Mani Kaye MD

## 2021-09-08 ENCOUNTER — TELEPHONE (OUTPATIENT)
Dept: NEUROSURGERY | Facility: CLINIC | Age: 66
End: 2021-09-08

## 2021-09-08 DIAGNOSIS — M51.37 DEGENERATION OF LUMBAR OR LUMBOSACRAL INTERVERTEBRAL DISC: Primary | ICD-10-CM

## 2021-09-08 DIAGNOSIS — M25.552 LEFT HIP PAIN: ICD-10-CM

## 2021-09-08 DIAGNOSIS — M41.20 SCOLIOSIS (AND KYPHOSCOLIOSIS), IDIOPATHIC: ICD-10-CM

## 2021-09-08 NOTE — TELEPHONE ENCOUNTER
I called the patient back to let her know that I would place the pain mgmt referral today.  She did not answer so I left her a voicemail letting her know.    emeka vaughan CMA

## 2021-09-08 NOTE — TELEPHONE ENCOUNTER
Caller: Wanda Victoria    Relationship to patient: Self    Best call back number: [phone]  Patient is needing: PATIENT STATES THEY ARE CALLING TO LET DR. ESTRADA KNOW THAT SHE IS WILLING TO TRY THE INJECTIONS THAT WERE SUGGESTED AT HER LAST VISIT ON 09/02/21.    PLEASE CALL THE PATIENT WITH QUESTIONS OR CONCERNS @ [phone]    PATIENT WOULD LIKE TO MAKE SURE HER HEALTH INSURANCE WILL COVER THESE, PLEASE ADVISE.    THANK YOU

## 2021-11-15 ENCOUNTER — TRANSCRIBE ORDERS (OUTPATIENT)
Dept: ADMINISTRATIVE | Facility: HOSPITAL | Age: 66
End: 2021-11-15

## 2021-11-15 ENCOUNTER — HOSPITAL ENCOUNTER (OUTPATIENT)
Dept: GENERAL RADIOLOGY | Facility: HOSPITAL | Age: 66
Discharge: HOME | End: 2021-11-15
Admitting: NURSE PRACTITIONER

## 2021-11-15 DIAGNOSIS — M47.816 LUMBAR SPONDYLOSIS: ICD-10-CM

## 2021-11-15 DIAGNOSIS — M46.1 SACROILIITIS, NOT ELSEWHERE CLASSIFIED (HCC): Primary | ICD-10-CM

## 2021-11-15 DIAGNOSIS — M54.16 LUMBAR RADICULOPATHY: ICD-10-CM

## 2021-11-15 DIAGNOSIS — M25.552 LEFT HIP PAIN: ICD-10-CM

## 2021-11-15 DIAGNOSIS — G89.29 OTHER CHRONIC PAIN: ICD-10-CM

## 2021-11-15 PROCEDURE — 73502 X-RAY EXAM HIP UNI 2-3 VIEWS: CPT

## 2021-12-03 ENCOUNTER — TRANSCRIBE ORDERS (OUTPATIENT)
Dept: ADMINISTRATIVE | Facility: HOSPITAL | Age: 66
End: 2021-12-03

## 2021-12-04 ENCOUNTER — TRANSCRIBE ORDERS (OUTPATIENT)
Dept: ADMINISTRATIVE | Facility: HOSPITAL | Age: 66
End: 2021-12-04

## 2021-12-04 DIAGNOSIS — N63.0 BREAST NODULE: Primary | ICD-10-CM

## 2021-12-09 ENCOUNTER — TRANSCRIBE ORDERS (OUTPATIENT)
Dept: ADMINISTRATIVE | Facility: HOSPITAL | Age: 66
End: 2021-12-09

## 2021-12-09 DIAGNOSIS — N63.0 BREAST NODULE: Primary | ICD-10-CM

## 2022-01-03 ENCOUNTER — LAB (OUTPATIENT)
Dept: LAB | Facility: HOSPITAL | Age: 67
End: 2022-01-03

## 2022-01-03 DIAGNOSIS — Z01.812 ENCOUNTER FOR PREOPERATIVE SCREENING LABORATORY TESTING FOR COVID-19 VIRUS: ICD-10-CM

## 2022-01-03 DIAGNOSIS — Z20.822 ENCOUNTER FOR PREOPERATIVE SCREENING LABORATORY TESTING FOR COVID-19 VIRUS: ICD-10-CM

## 2022-01-03 LAB — SARS-COV-2 ORF1AB RESP QL NAA+PROBE: NOT DETECTED

## 2022-01-03 PROCEDURE — U0004 COV-19 TEST NON-CDC HGH THRU: HCPCS

## 2022-01-03 PROCEDURE — U0005 INFEC AGEN DETEC AMPLI PROBE: HCPCS

## 2022-01-03 PROCEDURE — C9803 HOPD COVID-19 SPEC COLLECT: HCPCS

## 2022-01-03 NOTE — PROGRESS NOTES
"Chief Complaint  Mild persistent asthma without complication (white sputum occassionally; still around second hand smoke) and Centrilobular emphysema    Subjective    History of Present Illness     Wanda Victoria presents to [facility] PULMONARY & CRITICAL CARE MEDICINE   Ms. Victoria is a pleasant 66 year old female patient of Dr. Braydon Talavera with known centrolobular emphysema, Asthma,  basilar fibrosis, aortic root dilation, hx of demi mountain spotted fever, lyme disease. She is using Symbicort and rescue inhaler and finds benefit. She has used the rescue inhaler this week.  She uses her nebulizer as needed. Her PFTs today have shown moderate obstruction, moderately obstructed small airways, moderately reduced diffusion capacity and gas trapping.         Objective   Vital Signs:   /82   Pulse 78   Ht 175.3 cm (69\")   Wt 68.1 kg (150 lb 3.2 oz)   SpO2 96%   BMI 22.18 kg/m²     Physical Exam  Vitals reviewed.   Constitutional:       Appearance: Normal appearance.   Cardiovascular:      Rate and Rhythm: Normal rate and regular rhythm.   Pulmonary:      Effort: Pulmonary effort is normal.      Breath sounds: Normal breath sounds.   Neurological:      General: No focal deficit present.      Mental Status: She is alert and oriented to person, place, and time.   Psychiatric:         Mood and Affect: Mood normal.         Behavior: Behavior normal.          Result Review :  The following data was reviewed by: ELADIA Ivey on 01/05/2022:    My interpretation of imaging:  No new   My interpretation of labs: No new     PFT Values        Some values may be hidden. Unless noted otherwise, only the newest values recorded on each date are displayed.         Old Values PFT Results 1/5/22   No data to display.      Pre Drug PFT Results 1/5/22   FVC 63   FEV1 50   FEF 25-75% 26   FEV1/FVC 62.23      Post Drug PFT Results 1/5/22   No data to display.      Other Tests PFT Results 1/5/22 "         DLCO 67   D/VAsb 71           My interpretation of the PFT: No new     Results for orders placed in visit on 22    Pulmonary Function Test    Narrative  Pulmonary Function Test  Performed by: Sammi Abel, RRT  Authorized by: Steff Walter APRN    Pre Drug % Predicted  FVC: 63%  FEV1: 50%  FEF 25-75%: 26%  FEV1/FVC: 62.23%  T%  RV: 131%  DLCO: 67%  D/VAsb: 71%    Interpretation  Spirometry  Spirometry shows moderate obstruction. There is reduced midflow suggesting small airway/airflow obstruction.  Review of FVL curve  Patient's effort is reduced.  Lung Volume Measurements  Measurements show elevated residual volume consistent with gas trapping.  Diffusion Capacity  The patient's diffusion capacity is moderately reduced.  Diffusion capacity is moderately reduced when corrected for alveolar volume.      Patient's BMI 22.18. BMI is within normal parameters. No follow-up required..    Assessment and Plan   Diagnoses and all orders for this visit:    1. Encounter for preoperative screening laboratory testing for COVID-19 virus (Primary)  -     COVID PRE-OP / PRE-PROCEDURE SCREENING ORDER (NO ISOLATION) - Swab, Nasal Cavity; Future    2. Mild intermittent asthma without complication    3. Centrilobular emphysema (HCC)    4. Stage 2 moderate COPD by GOLD classification (HCC)      Reviewed PFTs with her. She is stable on her current inhalers. She will otherwise follow up in 6 months.     Health maintenance:   Influenza vaccine: Oct 2020   Covid-19 Moderna , Booster Oc      Follow Up   Return in about 6 months (around 2022).  Patient was given instructions and counseling regarding her condition or for health maintenance advice. Please see specific information pulled into the AVS if appropriate.     ELADIA Ivey  2022  11:50 CST

## 2022-01-05 ENCOUNTER — OFFICE VISIT (OUTPATIENT)
Dept: PULMONOLOGY | Facility: CLINIC | Age: 67
End: 2022-01-05

## 2022-01-05 ENCOUNTER — TRANSCRIBE ORDERS (OUTPATIENT)
Dept: ADMINISTRATIVE | Facility: HOSPITAL | Age: 67
End: 2022-01-05

## 2022-01-05 VITALS
DIASTOLIC BLOOD PRESSURE: 82 MMHG | BODY MASS INDEX: 22.25 KG/M2 | SYSTOLIC BLOOD PRESSURE: 140 MMHG | HEIGHT: 69 IN | HEART RATE: 78 BPM | WEIGHT: 150.2 LBS | OXYGEN SATURATION: 96 %

## 2022-01-05 DIAGNOSIS — Z01.812 ENCOUNTER FOR PREOPERATIVE SCREENING LABORATORY TESTING FOR COVID-19 VIRUS: Primary | ICD-10-CM

## 2022-01-05 DIAGNOSIS — J43.2 CENTRILOBULAR EMPHYSEMA: Chronic | ICD-10-CM

## 2022-01-05 DIAGNOSIS — J45.30 MILD PERSISTENT ASTHMA WITHOUT COMPLICATION: Chronic | ICD-10-CM

## 2022-01-05 DIAGNOSIS — J44.9 STAGE 2 MODERATE COPD BY GOLD CLASSIFICATION: Chronic | ICD-10-CM

## 2022-01-05 DIAGNOSIS — J45.20 MILD INTERMITTENT ASTHMA WITHOUT COMPLICATION: Chronic | ICD-10-CM

## 2022-01-05 DIAGNOSIS — Z20.822 ENCOUNTER FOR PREOPERATIVE SCREENING LABORATORY TESTING FOR COVID-19 VIRUS: Primary | ICD-10-CM

## 2022-01-05 DIAGNOSIS — N63.11 UNSPECIFIED LUMP IN THE RIGHT BREAST, UPPER OUTER QUADRANT: Primary | ICD-10-CM

## 2022-01-05 DIAGNOSIS — Z12.31 SCREENING MAMMOGRAM FOR HIGH-RISK PATIENT: ICD-10-CM

## 2022-01-05 PROCEDURE — 94729 DIFFUSING CAPACITY: CPT | Performed by: NURSE PRACTITIONER

## 2022-01-05 PROCEDURE — 94727 GAS DIL/WSHOT DETER LNG VOL: CPT | Performed by: NURSE PRACTITIONER

## 2022-01-05 PROCEDURE — 94010 BREATHING CAPACITY TEST: CPT | Performed by: NURSE PRACTITIONER

## 2022-01-05 PROCEDURE — 99214 OFFICE O/P EST MOD 30 MIN: CPT | Performed by: NURSE PRACTITIONER

## 2022-01-05 NOTE — PROCEDURES
Pulmonary Function Test  Performed by: Sammi Abel, RRT  Authorized by: Steff Walter APRN      Pre Drug % Predicted    FVC: 63%   FEV1: 50%   FEF 25-75%: 26%   FEV1/FVC: 62.23%   T%   RV: 131%   DLCO: 67%   D/VAsb: 71%    Interpretation   Spirometry   Spirometry shows moderate obstruction. There is reduced midflow suggesting small airway/airflow obstruction.   Review of FVL curve   Patient's effort is reduced.   Lung Volume Measurements  Measurements show elevated residual volume consistent with gas trapping.   Diffusion Capacity  The patient's diffusion capacity is moderately reduced.  Diffusion capacity is moderately reduced when corrected for alveolar volume.

## 2022-01-07 ENCOUNTER — HOSPITAL ENCOUNTER (OUTPATIENT)
Dept: ULTRASOUND IMAGING | Facility: HOSPITAL | Age: 67
Discharge: HOME | End: 2022-01-07

## 2022-01-07 ENCOUNTER — HOSPITAL ENCOUNTER (OUTPATIENT)
Dept: MAMMOGRAPHY | Facility: HOSPITAL | Age: 67
Discharge: HOME | End: 2022-01-07

## 2022-01-07 DIAGNOSIS — Z12.31 SCREENING MAMMOGRAM FOR HIGH-RISK PATIENT: ICD-10-CM

## 2022-01-07 DIAGNOSIS — N63.11 UNSPECIFIED LUMP IN THE RIGHT BREAST, UPPER OUTER QUADRANT: ICD-10-CM

## 2022-01-07 PROCEDURE — 76642 ULTRASOUND BREAST LIMITED: CPT

## 2022-01-07 PROCEDURE — 77066 DX MAMMO INCL CAD BI: CPT

## 2022-01-07 PROCEDURE — G0279 TOMOSYNTHESIS, MAMMO: HCPCS

## 2022-03-21 ENCOUNTER — TRANSCRIBE ORDERS (OUTPATIENT)
Dept: ADMINISTRATIVE | Facility: HOSPITAL | Age: 67
End: 2022-03-21

## 2022-03-21 DIAGNOSIS — M47.816 LUMBAR SPONDYLOSIS: ICD-10-CM

## 2022-03-21 DIAGNOSIS — M54.16 LUMBAR RADICULOPATHY: ICD-10-CM

## 2022-03-21 DIAGNOSIS — M46.1 SACROILIITIS, NOT ELSEWHERE CLASSIFIED: Primary | ICD-10-CM

## 2022-03-21 DIAGNOSIS — M47.26 OTHER SPONDYLOSIS WITH RADICULOPATHY, LUMBAR REGION: ICD-10-CM

## 2022-04-04 ENCOUNTER — HOSPITAL ENCOUNTER (OUTPATIENT)
Dept: CT IMAGING | Facility: HOSPITAL | Age: 67
Discharge: HOME | End: 2022-04-04
Admitting: NURSE PRACTITIONER

## 2022-04-04 DIAGNOSIS — M47.26 OTHER SPONDYLOSIS WITH RADICULOPATHY, LUMBAR REGION: ICD-10-CM

## 2022-04-04 DIAGNOSIS — M46.1 SACROILIITIS, NOT ELSEWHERE CLASSIFIED: ICD-10-CM

## 2022-04-04 DIAGNOSIS — M47.816 LUMBAR SPONDYLOSIS: ICD-10-CM

## 2022-04-04 DIAGNOSIS — M54.16 LUMBAR RADICULOPATHY: ICD-10-CM

## 2022-04-04 PROCEDURE — 72192 CT PELVIS W/O DYE: CPT

## 2022-04-19 ENCOUNTER — TRANSCRIBE ORDERS (OUTPATIENT)
Dept: ADMINISTRATIVE | Facility: HOSPITAL | Age: 67
End: 2022-04-19

## 2022-04-19 ENCOUNTER — HOSPITAL ENCOUNTER (OUTPATIENT)
Dept: GENERAL RADIOLOGY | Facility: HOSPITAL | Age: 67
Discharge: HOME | End: 2022-04-19
Admitting: NURSE PRACTITIONER

## 2022-04-19 DIAGNOSIS — M25.452 EFFUSION OF HIP JOINT, LEFT: Primary | ICD-10-CM

## 2022-04-19 DIAGNOSIS — M25.452 EFFUSION OF HIP JOINT, LEFT: ICD-10-CM

## 2022-04-19 PROCEDURE — 73502 X-RAY EXAM HIP UNI 2-3 VIEWS: CPT

## 2022-05-19 ENCOUNTER — TRANSCRIBE ORDERS (OUTPATIENT)
Dept: ADMINISTRATIVE | Facility: HOSPITAL | Age: 67
End: 2022-05-19

## 2022-05-19 DIAGNOSIS — M25.452 EFFUSION OF HIP JOINT, LEFT: Primary | ICD-10-CM

## 2022-06-06 ENCOUNTER — HOSPITAL ENCOUNTER (OUTPATIENT)
Dept: MRI IMAGING | Facility: HOSPITAL | Age: 67
Discharge: HOME | End: 2022-06-06
Admitting: NURSE PRACTITIONER

## 2022-06-06 DIAGNOSIS — M25.452 EFFUSION OF HIP JOINT, LEFT: ICD-10-CM

## 2022-06-06 PROCEDURE — 73721 MRI JNT OF LWR EXTRE W/O DYE: CPT

## 2022-07-05 PROBLEM — J44.9 STAGE 2 MODERATE COPD BY GOLD CLASSIFICATION: Chronic | Status: ACTIVE | Noted: 2022-07-05

## 2022-07-05 PROBLEM — Z87.09 HISTORY OF ASTHMA: Status: ACTIVE | Noted: 2022-07-05

## 2022-07-05 PROBLEM — J43.2 CENTRILOBULAR EMPHYSEMA (HCC): Chronic | Status: ACTIVE | Noted: 2022-07-05

## 2022-07-05 NOTE — PROGRESS NOTES
" ELADIA Ivey  [facility]   Pulmonary and Critical Care  [address]  [place] [address]  Phone: [phone]  Fax: [phone]           Chief Complaint  Centrilobular emphysema, Mild persistent asthma without complicatio (No new issues per pt), and Shortness of Breath    Subjective    History of Present Illness     Wanda Victoria presents to [facility] PULMONARY & CRITICAL CARE MEDICINE   Ms. Victoria is a pleasant 67 year old female patient of Dr. Braydon Talavera with known centrolobular emphysema, Asthma,  basilar fibrosis, aortic root dilation, hx of demi mountain spotted fever, lyme disease. She is using Symbicort and rescue inhaler and finds benefit. The last use of albuterol HFA was a few days ago. He is doing all her medications as needed. She has been outside a lot this summer. Use of inhalers depends on how much she is outside and how much she is around her  smoking.   She uses her nebulizer as needed. Last use was about 2 months ago. She feels like she is pretty controlled at this time. Her PFTs today have shown moderate obstruction, moderately obstructed small airways, moderately reduced diffusion capacity and gas trapping.  About once a week she will notice she is wheezing or when she lays down she will have crackles.        Objective   Vital Signs:   /68   Pulse 70   Ht 175.3 cm (69\")   Wt 68.5 kg (151 lb)   SpO2 97%   BMI 22.30 kg/m²     Physical Exam  Vitals reviewed.   Constitutional:       Appearance: Normal appearance.   Cardiovascular:      Rate and Rhythm: Normal rate and regular rhythm.   Pulmonary:      Effort: Pulmonary effort is normal.      Breath sounds: Normal breath sounds.   Neurological:      General: No focal deficit present.      Mental Status: She is alert and oriented to person, place, and time.   Psychiatric:         Mood and Affect: Mood normal.         Behavior: Behavior normal.          Result Review :  The " following data was reviewed by: ELADIA Ivey on 2022:    My interpretation of imaging:  No new  My interpretation of labs: No new     PFT Values        Some values may be hidden. Unless noted otherwise, only the newest values recorded on each date are displayed.         Old Values PFT Results 22   No data to display.      Pre Drug PFT Results 22   FVC 63   FEV1 50   FEF 25-75% 26   FEV1/FVC 62.23      Post Drug PFT Results 22   No data to display.      Other Tests PFT Results 22         DLCO 67   D/VAsb 71           My interpretation of the PFT: no new    Results for orders placed in visit on 22    Pulmonary Function Test    Narrative  Pulmonary Function Test  Performed by: Sammi Abel, RRT  Authorized by: Steff Walter APRN    Pre Drug % Predicted  FVC: 63%  FEV1: 50%  FEF 25-75%: 26%  FEV1/FVC: 62.23%  T%  RV: 131%  DLCO: 67%  D/VAsb: 71%    Interpretation  Spirometry  Spirometry shows moderate obstruction. There is reduced midflow suggesting small airway/airflow obstruction.  Review of FVL curve  Patient's effort is reduced.  Lung Volume Measurements  Measurements show elevated residual volume consistent with gas trapping.  Diffusion Capacity  The patient's diffusion capacity is moderately reduced.  Diffusion capacity is moderately reduced when corrected for alveolar volume.        Assessment and Plan   Diagnoses and all orders for this visit:    1. Stage 2 moderate COPD by GOLD classification (HCC) (Primary)    2. Centrilobular emphysema ([facility])    3. History of asthma    4. Non-smoker      She is stable at this time. She feels like using her inhalers as needed is keeping her pretty controlled. She will check with her PCP in regards to her pneumonia vaccination. She is agreeable to have the Alpha 1 testing today. Handout provided as well.     Alpha 1: will test today.       Follow Up   No follow-ups on file.  Patient was given  instructions and counseling regarding her condition or for health maintenance advice. Please see specific information pulled into the AVS if appropriate.     Steff Walter, ELADIA  7/6/2022  10:52 CDT

## 2022-07-06 ENCOUNTER — OFFICE VISIT (OUTPATIENT)
Dept: PULMONOLOGY | Facility: CLINIC | Age: 67
End: 2022-07-06

## 2022-07-06 VITALS
OXYGEN SATURATION: 97 % | BODY MASS INDEX: 22.36 KG/M2 | SYSTOLIC BLOOD PRESSURE: 122 MMHG | WEIGHT: 151 LBS | HEIGHT: 69 IN | DIASTOLIC BLOOD PRESSURE: 68 MMHG | HEART RATE: 70 BPM

## 2022-07-06 DIAGNOSIS — Z87.09 HISTORY OF ASTHMA: ICD-10-CM

## 2022-07-06 DIAGNOSIS — J44.9 STAGE 2 MODERATE COPD BY GOLD CLASSIFICATION: Primary | Chronic | ICD-10-CM

## 2022-07-06 DIAGNOSIS — J43.2 CENTRILOBULAR EMPHYSEMA: Chronic | ICD-10-CM

## 2022-07-06 DIAGNOSIS — Z78.9 NON-SMOKER: ICD-10-CM

## 2022-07-06 PROCEDURE — 99214 OFFICE O/P EST MOD 30 MIN: CPT | Performed by: NURSE PRACTITIONER

## 2022-07-06 RX ORDER — MELOXICAM 15 MG/1
15 TABLET ORAL DAILY
COMMUNITY
Start: 2022-05-19

## 2022-07-06 RX ORDER — PREGABALIN 50 MG/1
1 CAPSULE ORAL EVERY 8 HOURS PRN
COMMUNITY
Start: 2022-05-19

## 2022-07-06 RX ORDER — HYDROCHLOROTHIAZIDE 12.5 MG/1
12.5 CAPSULE, GELATIN COATED ORAL DAILY
COMMUNITY
Start: 2022-05-02

## 2022-07-15 ENCOUNTER — TRANSCRIBE ORDERS (OUTPATIENT)
Dept: LAB | Facility: HOSPITAL | Age: 67
End: 2022-07-15

## 2022-07-15 DIAGNOSIS — Z11.59 SCREENING FOR VIRAL DISEASE: Primary | ICD-10-CM

## 2022-07-18 ENCOUNTER — LAB (OUTPATIENT)
Dept: LAB | Facility: HOSPITAL | Age: 67
End: 2022-07-18

## 2022-07-18 LAB — SARS-COV-2 ORF1AB RESP QL NAA+PROBE: NOT DETECTED

## 2022-07-18 PROCEDURE — U0004 COV-19 TEST NON-CDC HGH THRU: HCPCS | Performed by: ORTHOPAEDIC SURGERY

## 2022-07-18 PROCEDURE — C9803 HOPD COVID-19 SPEC COLLECT: HCPCS

## 2022-07-18 PROCEDURE — U0005 INFEC AGEN DETEC AMPLI PROBE: HCPCS | Performed by: ORTHOPAEDIC SURGERY

## 2022-07-21 ENCOUNTER — HOSPITAL ENCOUNTER (OUTPATIENT)
Facility: HOSPITAL | Age: 67
Setting detail: HOSPITAL OUTPATIENT SURGERY
Discharge: HOME | End: 2022-07-21
Attending: ORTHOPAEDIC SURGERY | Admitting: ORTHOPAEDIC SURGERY

## 2022-07-21 ENCOUNTER — APPOINTMENT (OUTPATIENT)
Dept: GENERAL RADIOLOGY | Facility: HOSPITAL | Age: 67
End: 2022-07-21

## 2022-07-21 ENCOUNTER — ANESTHESIA (OUTPATIENT)
Dept: PERIOP | Facility: HOSPITAL | Age: 67
End: 2022-07-21

## 2022-07-21 ENCOUNTER — ANESTHESIA EVENT (OUTPATIENT)
Dept: PERIOP | Facility: HOSPITAL | Age: 67
End: 2022-07-21

## 2022-07-21 VITALS
RESPIRATION RATE: 16 BRPM | HEIGHT: 69 IN | DIASTOLIC BLOOD PRESSURE: 77 MMHG | BODY MASS INDEX: 22.01 KG/M2 | WEIGHT: 148.59 LBS | OXYGEN SATURATION: 97 % | TEMPERATURE: 96.8 F | SYSTOLIC BLOOD PRESSURE: 105 MMHG | HEART RATE: 68 BPM

## 2022-07-21 PROCEDURE — 25010000002 METHYLPREDNISOLONE PER 80 MG: Performed by: ORTHOPAEDIC SURGERY

## 2022-07-21 PROCEDURE — 25010000002 PROPOFOL 1000 MG/100ML EMULSION

## 2022-07-21 PROCEDURE — 76000 FLUOROSCOPY <1 HR PHYS/QHP: CPT

## 2022-07-21 PROCEDURE — 73501 X-RAY EXAM HIP UNI 1 VIEW: CPT

## 2022-07-21 PROCEDURE — 25010000002 ROPIVACAINE PER 1 MG: Performed by: ORTHOPAEDIC SURGERY

## 2022-07-21 PROCEDURE — 25010000002 IOPAMIDOL 61 % SOLUTION: Performed by: ORTHOPAEDIC SURGERY

## 2022-07-21 RX ORDER — LIDOCAINE HYDROCHLORIDE 20 MG/ML
INJECTION, SOLUTION EPIDURAL; INFILTRATION; INTRACAUDAL; PERINEURAL AS NEEDED
Status: DISCONTINUED | OUTPATIENT
Start: 2022-07-21 | End: 2022-07-21 | Stop reason: SURG

## 2022-07-21 RX ORDER — DROPERIDOL 2.5 MG/ML
0.62 INJECTION, SOLUTION INTRAMUSCULAR; INTRAVENOUS ONCE AS NEEDED
Status: DISCONTINUED | OUTPATIENT
Start: 2022-07-21 | End: 2022-07-21 | Stop reason: HOSPADM

## 2022-07-21 RX ORDER — SODIUM CHLORIDE 0.9 % (FLUSH) 0.9 %
10 SYRINGE (ML) INJECTION EVERY 12 HOURS SCHEDULED
Status: DISCONTINUED | OUTPATIENT
Start: 2022-07-21 | End: 2022-07-21 | Stop reason: HOSPADM

## 2022-07-21 RX ORDER — IBUPROFEN 600 MG/1
600 TABLET ORAL ONCE AS NEEDED
Status: DISCONTINUED | OUTPATIENT
Start: 2022-07-21 | End: 2022-07-21 | Stop reason: HOSPADM

## 2022-07-21 RX ORDER — ONDANSETRON 2 MG/ML
4 INJECTION INTRAMUSCULAR; INTRAVENOUS ONCE AS NEEDED
Status: DISCONTINUED | OUTPATIENT
Start: 2022-07-21 | End: 2022-07-21 | Stop reason: HOSPADM

## 2022-07-21 RX ORDER — FENTANYL CITRATE 50 UG/ML
25 INJECTION, SOLUTION INTRAMUSCULAR; INTRAVENOUS
Status: DISCONTINUED | OUTPATIENT
Start: 2022-07-21 | End: 2022-07-21 | Stop reason: HOSPADM

## 2022-07-21 RX ORDER — SODIUM CHLORIDE 0.9 % (FLUSH) 0.9 %
10 SYRINGE (ML) INJECTION AS NEEDED
Status: DISCONTINUED | OUTPATIENT
Start: 2022-07-21 | End: 2022-07-21 | Stop reason: HOSPADM

## 2022-07-21 RX ORDER — ACETAMINOPHEN 500 MG
1000 TABLET ORAL EVERY 6 HOURS PRN
COMMUNITY

## 2022-07-21 RX ORDER — ROPIVACAINE HYDROCHLORIDE 5 MG/ML
INJECTION, SOLUTION EPIDURAL; INFILTRATION; PERINEURAL AS NEEDED
Status: DISCONTINUED | OUTPATIENT
Start: 2022-07-21 | End: 2022-07-21 | Stop reason: HOSPADM

## 2022-07-21 RX ORDER — LIDOCAINE HYDROCHLORIDE 10 MG/ML
0.5 INJECTION, SOLUTION EPIDURAL; INFILTRATION; INTRACAUDAL; PERINEURAL ONCE AS NEEDED
Status: DISCONTINUED | OUTPATIENT
Start: 2022-07-21 | End: 2022-07-21 | Stop reason: HOSPADM

## 2022-07-21 RX ORDER — NALOXONE HCL 0.4 MG/ML
0.4 VIAL (ML) INJECTION AS NEEDED
Status: DISCONTINUED | OUTPATIENT
Start: 2022-07-21 | End: 2022-07-21 | Stop reason: HOSPADM

## 2022-07-21 RX ORDER — SODIUM CHLORIDE 0.9 % (FLUSH) 0.9 %
3 SYRINGE (ML) INJECTION AS NEEDED
Status: DISCONTINUED | OUTPATIENT
Start: 2022-07-21 | End: 2022-07-21 | Stop reason: HOSPADM

## 2022-07-21 RX ORDER — SODIUM CHLORIDE, SODIUM LACTATE, POTASSIUM CHLORIDE, CALCIUM CHLORIDE 600; 310; 30; 20 MG/100ML; MG/100ML; MG/100ML; MG/100ML
1000 INJECTION, SOLUTION INTRAVENOUS CONTINUOUS
Status: DISCONTINUED | OUTPATIENT
Start: 2022-07-21 | End: 2022-07-21 | Stop reason: HOSPADM

## 2022-07-21 RX ORDER — FLUMAZENIL 0.1 MG/ML
0.2 INJECTION INTRAVENOUS AS NEEDED
Status: DISCONTINUED | OUTPATIENT
Start: 2022-07-21 | End: 2022-07-21 | Stop reason: HOSPADM

## 2022-07-21 RX ORDER — OXYCODONE AND ACETAMINOPHEN 10; 325 MG/1; MG/1
1 TABLET ORAL ONCE AS NEEDED
Status: DISCONTINUED | OUTPATIENT
Start: 2022-07-21 | End: 2022-07-21 | Stop reason: HOSPADM

## 2022-07-21 RX ORDER — SODIUM CHLORIDE, SODIUM LACTATE, POTASSIUM CHLORIDE, CALCIUM CHLORIDE 600; 310; 30; 20 MG/100ML; MG/100ML; MG/100ML; MG/100ML
9 INJECTION, SOLUTION INTRAVENOUS CONTINUOUS
Status: DISCONTINUED | OUTPATIENT
Start: 2022-07-21 | End: 2022-07-21 | Stop reason: HOSPADM

## 2022-07-21 RX ORDER — PROPOFOL 10 MG/ML
INJECTION, EMULSION INTRAVENOUS AS NEEDED
Status: DISCONTINUED | OUTPATIENT
Start: 2022-07-21 | End: 2022-07-21 | Stop reason: SURG

## 2022-07-21 RX ORDER — DIPHENHYDRAMINE HCL 25 MG
25 CAPSULE ORAL EVERY 6 HOURS PRN
COMMUNITY

## 2022-07-21 RX ORDER — DEXTROSE MONOHYDRATE 25 G/50ML
12.5 INJECTION, SOLUTION INTRAVENOUS AS NEEDED
Status: DISCONTINUED | OUTPATIENT
Start: 2022-07-21 | End: 2022-07-21 | Stop reason: HOSPADM

## 2022-07-21 RX ORDER — LABETALOL HYDROCHLORIDE 5 MG/ML
5 INJECTION, SOLUTION INTRAVENOUS
Status: DISCONTINUED | OUTPATIENT
Start: 2022-07-21 | End: 2022-07-21 | Stop reason: HOSPADM

## 2022-07-21 RX ORDER — MELOXICAM 7.5 MG/1
7.5 TABLET ORAL ONCE AS NEEDED
Status: DISCONTINUED | OUTPATIENT
Start: 2022-07-21 | End: 2022-07-21 | Stop reason: HOSPADM

## 2022-07-21 RX ORDER — OXYCODONE AND ACETAMINOPHEN 7.5; 325 MG/1; MG/1
2 TABLET ORAL EVERY 4 HOURS PRN
Status: DISCONTINUED | OUTPATIENT
Start: 2022-07-21 | End: 2022-07-21 | Stop reason: HOSPADM

## 2022-07-21 RX ADMIN — PROPOFOL 60 MG: 10 INJECTION, EMULSION INTRAVENOUS at 07:10

## 2022-07-21 RX ADMIN — LIDOCAINE HYDROCHLORIDE 20 MG: 20 INJECTION, SOLUTION EPIDURAL; INFILTRATION; INTRACAUDAL; PERINEURAL at 07:05

## 2022-07-21 RX ADMIN — SODIUM CHLORIDE, POTASSIUM CHLORIDE, SODIUM LACTATE AND CALCIUM CHLORIDE: 600; 310; 30; 20 INJECTION, SOLUTION INTRAVENOUS at 07:01

## 2022-07-21 NOTE — ANESTHESIA POSTPROCEDURE EVALUATION
"Patient: Wanda Victoria    Procedure Summary     Date: 07/21/22 Room / Location:  [facility] OR  /  [facility] OR    Anesthesia Start: 0701 Anesthesia Stop: 0717    Procedure: LEFT HIP AND GREATER TROCHANTERIC BURSA FLUOROSCOPIC GUIDED CORTICOSTEROID INJECTION (Left Hip) Diagnosis: (LEFT HIP PAIN)    Surgeons: Rodolfo Arreola MD Provider: Karen Jj CRNA    Anesthesia Type: MAC ASA Status: 2          Anesthesia Type: MAC    Vitals  Vitals Value Taken Time   /65 07/21/22 0816   Temp 96.8 °F (36 °C) 07/21/22 0720   Pulse 71 07/21/22 0816   Resp 16 07/21/22 0725   SpO2 98 % 07/21/22 0816   Vitals shown include unvalidated device data.        Post Anesthesia Care and Evaluation    Patient location during evaluation: PACU  Patient participation: complete - patient participated  Level of consciousness: awake and alert  Pain management: adequate    Airway patency: patent  Anesthetic complications: No anesthetic complications  PONV Status: none  Cardiovascular status: acceptable and hemodynamically stable  Respiratory status: acceptable  Hydration status: acceptable    Comments: Blood pressure 105/77, pulse 68, temperature 96.8 °F (36 °C), temperature source Temporal, resp. rate 16, height 175 cm (68.9\"), weight 67.4 kg (148 lb 9.4 oz), SpO2 97 %, not currently breastfeeding.    Patient discharged from PACU based upon Fab score. Please see RN notes for further details      "

## 2022-07-21 NOTE — ANESTHESIA PREPROCEDURE EVALUATION
Anesthesia Evaluation     Patient summary reviewed   no history of anesthetic complications:  NPO Solid Status: > 8 hours             Airway   Mallampati: II  Dental      Pulmonary    (+) COPD,   (-) asthma, sleep apnea, not a smoker  Cardiovascular   Exercise tolerance: good (4-7 METS)    (+) hypertension,   (-) pacemaker, past MI, angina, cardiac stents      Neuro/Psych  (-) seizures, TIA, CVA  GI/Hepatic/Renal/Endo    (-) GERD, liver disease, no renal disease, diabetes    Musculoskeletal     Abdominal    Substance History      OB/GYN          Other                        Anesthesia Plan    ASA 2     MAC       Anesthetic plan, risks, benefits, and alternatives have been provided, discussed and informed consent has been obtained with: patient.        CODE STATUS:

## 2022-07-21 NOTE — OP NOTE
OPERATIVE NOTE    PREOPERATIVE DIAGNOSIS:   1.  Left  hip pain  2.  Left hip trochanteric bursitis    POSTOPERATIVE DIAGNOSIS:   1.  Left hip pain  2.  Left hip trochanteric bursitis    PROCEDURE:    1. Left hip fluoroscopically guided intra-articular corticosteroid injection of 80 mg of Depo-Medrol and 8 mL of 0.2% Naropin  2.   Left hip trochanteric bursal injection of 80 mg of Depo-Medrol and 8 mL of 0.2% Naropin under fluoroscopic guidance.     SURGEON:  Rodolfo Arreola M.D.    ASSISTANT:  None    ANESTHESIA:  General    ESTIMATED BLOOD LOSS:  None    COMPLICATIONS:  None.    CONDITION:  Stable.    IMPLANTS:  INDICATION FOR PROCEDURE:  67 y.o. female presents with left intra-articular hip pain for an intra-articular corticosteroid injection and trochanteric bursal injection.      DESCRIPTION OF PROCEDURE:  The patient was interviewed in the preanesthesia area where the operative extremity was marked with a marking pen.  The patient was then taken to the operative suite where general endotracheal anesthesia was performed per the anesthesia team.  A timeout was called to confirm the patient, the operative site, the planned procedure, and the administration of antibiotics.  The patient was then prepped and draped in a standard sterile fashion using ChloraPrep.     Fluoroscopy was brought in to localize the patient's hip joint.  An 18-gauge spinal needle was then taken down to the center of the femoral neck.  1 mL of Omnipaque dye with 3 mL of 0.2% Naropin were then used to create an arthrogram.  A syringe with 80 mg Depo-Medrol and 80 mL of 0.2% Naropin was then attached to the spinal needle and infiltrated into the hip under fluoroscopy.      The trochanter was then localized under fluoroscopy.  An 18-gauge spinal needle was then taken down to the level of the trochanter.  80 mg of Depo-Medrol and 8 cc of 0.2% Naropin were then infiltrated into the area.      The patient tolerated the procedure without difficulties and  was transferred to the PACU in stable condition.    Rodolfo Arreola M.D.

## 2022-08-01 DIAGNOSIS — J44.9 STAGE 2 MODERATE COPD BY GOLD CLASSIFICATION: Chronic | ICD-10-CM

## 2022-12-12 ENCOUNTER — TRANSCRIBE ORDERS (OUTPATIENT)
Dept: ADMINISTRATIVE | Facility: HOSPITAL | Age: 67
End: 2022-12-12

## 2022-12-12 ENCOUNTER — HOSPITAL ENCOUNTER (OUTPATIENT)
Dept: ULTRASOUND IMAGING | Facility: HOSPITAL | Age: 67
Discharge: HOME | End: 2022-12-12
Admitting: ORTHOPAEDIC SURGERY

## 2022-12-12 DIAGNOSIS — I82.401 ACUTE EMBOLISM AND THROMBOSIS OF DEEP VEIN OF RIGHT LOWER EXTREMITY: ICD-10-CM

## 2022-12-12 DIAGNOSIS — I82.401 ACUTE EMBOLISM AND THROMBOSIS OF DEEP VEIN OF RIGHT LOWER EXTREMITY: Primary | ICD-10-CM

## 2022-12-12 PROCEDURE — 93971 EXTREMITY STUDY: CPT

## 2023-01-09 NOTE — PROGRESS NOTES
" ELADIA Ivey  [facility]   Pulmonary and Critical Care  [address] [place] [address]  Phone: [phone]  Fax: [phone]           Chief Complaint  Stage 2 moderate COPD by GOLD classification (HCC) and Mild persistent asthma without complication    Subjective    History of Present Illness     Wanda Victoria presents to [facility] PULMONARY & CRITICAL CARE MEDICINE   History of Present Illness  Ms. Victoria is a pleasant 67 year old female patient centrolobular emphysema, Asthma,  basilar fibrosis, aortic root dilation, hx of demi mountain spotted fever, lyme disease. She is using Symbicort and rescue inhaler and finds benefit. She uses the albuterol HFA 1 a month or so.  Her  continues to smoke.   She uses her nebulizer as needed and last use was months ago. She feels like she is pretty controlled at this time. She denies fever, chills, night sweats. She did have staph infection recently and SVT in her leg.        Objective   Vital Signs:   /68   Pulse 74   Ht 175 cm (68.9\")   Wt 65 kg (143 lb 3.2 oz)   SpO2 98%   BMI 21.21 kg/m²     Physical Exam  Vitals reviewed.   Constitutional:       Appearance: Normal appearance.   Cardiovascular:      Rate and Rhythm: Normal rate and regular rhythm.   Pulmonary:      Effort: Pulmonary effort is normal.      Breath sounds: Normal breath sounds.   Neurological:      General: No focal deficit present.      Mental Status: She is alert and oriented to person, place, and time.   Psychiatric:         Mood and Affect: Mood normal.         Behavior: Behavior normal.          Result Review :  The following data was reviewed by: ELADIA Ivey on 01/11/2023:    My interpretation of imaging:  No new   My interpretation of labs: no new     PFT Values        Some values may be hidden. Unless noted otherwise, only the newest values recorded on each date are displayed.         Old Values PFT Results " 22   No data to display.      Pre Drug PFT Results 22   FVC 63   FEV1 50   FEF 25-75% 26   FEV1/FVC 62.23      Post Drug PFT Results 22   No data to display.      Other Tests PFT Results 22         DLCO 67   D/VAsb 71           My interpretation of the PFT: no new     Results for orders placed in visit on 22    Pulmonary Function Test    Narrative  Pulmonary Function Test  Performed by: Sammi Abel, RRT  Authorized by: Steff Walter APRN    Pre Drug % Predicted  FVC: 63%  FEV1: 50%  FEF 25-75%: 26%  FEV1/FVC: 62.23%  T%  RV: 131%  DLCO: 67%  D/VAsb: 71%    Interpretation  Spirometry  Spirometry shows moderate obstruction. There is reduced midflow suggesting small airway/airflow obstruction.  Review of FVL curve  Patient's effort is reduced.  Lung Volume Measurements  Measurements show elevated residual volume consistent with gas trapping.  Diffusion Capacity  The patient's diffusion capacity is moderately reduced.  Diffusion capacity is moderately reduced when corrected for alveolar volume.          Assessment and Plan   Diagnoses and all orders for this visit:    1. Stage 2 moderate COPD by GOLD classification (HCC) (Primary)    2. Centrilobular emphysema (HCC)    3. History of asthma    4. Non-smoker        Patient is doing well at this time and does not require her albuterol HFA often and has not used her nebulizer in months. She is concerned about being on the Symbicort with ICS and already only does 2 puffs daily. She will try going off of it and use her albuterol as needed and see how she does. She will resume the Symbicort should her symptoms worsen. Follow up in 6 months.       Follow Up   No follow-ups on file.  Patient was given instructions and counseling regarding her condition or for health maintenance advice. Please see specific information pulled into the AVS if appropriate.     ELADIA Ivey  2023  11:27 CST

## 2023-01-11 ENCOUNTER — OFFICE VISIT (OUTPATIENT)
Dept: PULMONOLOGY | Facility: CLINIC | Age: 68
End: 2023-01-11
Payer: MEDICARE

## 2023-01-11 VITALS
HEART RATE: 74 BPM | HEIGHT: 69 IN | DIASTOLIC BLOOD PRESSURE: 68 MMHG | BODY MASS INDEX: 21.21 KG/M2 | WEIGHT: 143.2 LBS | OXYGEN SATURATION: 98 % | SYSTOLIC BLOOD PRESSURE: 122 MMHG

## 2023-01-11 DIAGNOSIS — Z78.9 NON-SMOKER: ICD-10-CM

## 2023-01-11 DIAGNOSIS — Z87.09 HISTORY OF ASTHMA: ICD-10-CM

## 2023-01-11 DIAGNOSIS — J44.9 STAGE 2 MODERATE COPD BY GOLD CLASSIFICATION: Primary | Chronic | ICD-10-CM

## 2023-01-11 DIAGNOSIS — J43.2 CENTRILOBULAR EMPHYSEMA: Chronic | ICD-10-CM

## 2023-01-11 PROCEDURE — 99214 OFFICE O/P EST MOD 30 MIN: CPT | Performed by: NURSE PRACTITIONER

## 2023-08-09 ENCOUNTER — HOSPITAL ENCOUNTER (OUTPATIENT)
Dept: ULTRASOUND IMAGING | Facility: HOSPITAL | Age: 68
Discharge: HOME | End: 2023-08-09
Admitting: PHYSICIAN ASSISTANT
Payer: MEDICARE

## 2023-08-09 ENCOUNTER — TRANSCRIBE ORDERS (OUTPATIENT)
Dept: ADMINISTRATIVE | Facility: HOSPITAL | Age: 68
End: 2023-08-09
Payer: MEDICARE

## 2023-08-09 DIAGNOSIS — R60.0 LOCALIZED EDEMA: Primary | ICD-10-CM

## 2023-08-09 DIAGNOSIS — R60.0 LOCALIZED EDEMA: ICD-10-CM

## 2023-08-09 PROCEDURE — 93971 EXTREMITY STUDY: CPT

## 2023-08-28 NOTE — PROGRESS NOTES
" ELADIA Ivey  [facility]   Pulmonary and Critical Care  [address]  [place] [address]  Phone: [phone]  Fax: [phone]           Chief Complaint  COPD, Asthma, and Shortness of Breath    Subjective    History of Present Illness     Wanda Victoria presents to [facility] PULMONARY & CRITICAL CARE MEDICINE   History of Present Illness  Ms. Victoria is a pleasant 68 year old female patient centrolobular emphysema, asthma, basilar fibrosis, aortic root dilation, hx of demi mountain spotted fever, lyme disease. She is using Symbicort and noted last month she was having to use her inhaler more frequently. She uses a rescue inhaler and finds benefit  She uses the albuterol HFA when the heat  and humidity was up.  She has cough that is productive of clear/ creamy sputum.  She uses her nebulizer as needed and last use was in a long while. She denies fever, chills, night sweats.  She had an echo July at [facility] and she has brought the report in today that shows normal EF no evidence of pulmonary hypertension. Her FVL and diffusion capacity today shows improvement in spirometry with FEV1 up from 50% predicted to 80%.       Objective   Vital Signs:   /64   Pulse 90   Ht 172.1 cm (67.75\")   Wt 66.1 kg (145 lb 12.8 oz)   SpO2 95% Comment: RA  BMI 22.33 kg/mý     Physical Exam  Vitals reviewed.   Constitutional:       Appearance: Normal appearance.   Cardiovascular:      Rate and Rhythm: Normal rate and regular rhythm.   Pulmonary:      Effort: Pulmonary effort is normal.      Breath sounds: Normal breath sounds.   Neurological:      General: No focal deficit present.      Mental Status: She is alert and oriented to person, place, and time.   Psychiatric:         Mood and Affect: Mood normal.         Behavior: Behavior normal.        Result Review :  The following data was reviewed by: ELADIA Ivey on 08/31/2023:    Data reviewed : Cardiology " studies Echo from [facility]    My interpretation of imaging:  as in HPI  My interpretation of labs: none    PFT Values          2022    11:00 2023    13:15   Pre Drug PFT Results   FVC 63 87   FEV1 50 80   FEF 25-75% 26 71   FEV1/FVC 62.23 70   Other Tests PFT Results           DLCO 67 43   D/VAsb 71 59     My interpretation of the PFT : as in HPI    Results for orders placed in visit on 23    Pulmonary Function Test    Narrative  Pulmonary Function Test  Performed by: Sammi Abel, RRT  Authorized by: Steff Walter APRN    Pre Drug % Predicted  FVC: 87%  FEV1: 80%  FEF 25-75%: 71%  FEV1/FVC: 70%  DLCO: 43%  D/VAsb: 59%    Interpretation  Spirometry  Spirometry shows mild obstruction.  Review of FVL curve  There is a flattening of the inspiratory curve, suggesting variable extrathoracic obstruction.  Diffusion Capacity  The patient's diffusion capacity is moderately reduced.  Diffusion capacity is moderately reduced when corrected for alveolar volume.  Overall comments: Compared to  her FEV1 has improved from 50% predicted to 80% predicted. Her diffusion capacity has decreased.      Results for orders placed in visit on 22    Pulmonary Function Test    Narrative  Pulmonary Function Test  Performed by: Sammi Abel, RRT  Authorized by: Steff Walter APRN    Pre Drug % Predicted  FVC: 63%  FEV1: 50%  FEF 25-75%: 26%  FEV1/FVC: 62.23%  T%  RV: 131%  DLCO: 67%  D/VAsb: 71%    Interpretation  Spirometry  Spirometry shows moderate obstruction. There is reduced midflow suggesting small airway/airflow obstruction.  Review of FVL curve  Patient's effort is reduced.  Lung Volume Measurements  Measurements show elevated residual volume consistent with gas trapping.  Diffusion Capacity  The patient's diffusion capacity is moderately reduced.  Diffusion capacity is moderately reduced when corrected for alveolar volume.        Assessment and Plan    Diagnoses and all orders for this visit:    1. Stage 2 moderate COPD by GOLD classification (Primary)    2. Centrilobular emphysema    3. History of asthma    4. Non-smoker    5. Dyspnea on exertion      She is doing well on the Symbicort and will continue. She is using the albuterol HFA or nebulizer as needed. She will contact us should she have any flares or urgent needs to come up otherwise follow up in 6 months. 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     Alpha 1: MM, normal       Follow Up   Return in about 6 months (around 2/29/2024).  Patient was given instructions and counseling regarding her condition or for health maintenance advice. Please see specific information pulled into the AVS if appropriate.     Steff Walter, APRN  8/31/2023  13:51 CDT

## 2023-08-31 ENCOUNTER — OFFICE VISIT (OUTPATIENT)
Dept: PULMONOLOGY | Facility: CLINIC | Age: 68
End: 2023-08-31
Payer: MEDICARE

## 2023-08-31 ENCOUNTER — PROCEDURE VISIT (OUTPATIENT)
Dept: PULMONOLOGY | Facility: CLINIC | Age: 68
End: 2023-08-31
Payer: MEDICARE

## 2023-08-31 VITALS
OXYGEN SATURATION: 95 % | HEIGHT: 68 IN | SYSTOLIC BLOOD PRESSURE: 116 MMHG | HEART RATE: 90 BPM | BODY MASS INDEX: 22.1 KG/M2 | DIASTOLIC BLOOD PRESSURE: 64 MMHG | WEIGHT: 145.8 LBS

## 2023-08-31 DIAGNOSIS — R06.09 DYSPNEA ON EXERTION: ICD-10-CM

## 2023-08-31 DIAGNOSIS — J44.9 STAGE 2 MODERATE COPD BY GOLD CLASSIFICATION: Chronic | ICD-10-CM

## 2023-08-31 DIAGNOSIS — J43.2 CENTRILOBULAR EMPHYSEMA: Chronic | ICD-10-CM

## 2023-08-31 DIAGNOSIS — J44.9 STAGE 2 MODERATE COPD BY GOLD CLASSIFICATION: Primary | ICD-10-CM

## 2023-08-31 DIAGNOSIS — Z78.9 NON-SMOKER: ICD-10-CM

## 2023-08-31 DIAGNOSIS — Z87.09 HISTORY OF ASTHMA: ICD-10-CM

## 2023-08-31 NOTE — PROCEDURES
Pulmonary Function Test  Performed by: Sammi Abel, RRT  Authorized by: Steff Walter APRN      Pre Drug % Predicted    FVC: 87%   FEV1: 80%   FEF 25-75%: 71%   FEV1/FVC: 70%   DLCO: 43%   D/VAsb: 59%    Interpretation   Spirometry   Spirometry shows mild obstruction.   Review of FVL curve   There is a flattening of the inspiratory curve, suggesting variable extrathoracic obstruction.   Diffusion Capacity  The patient's diffusion capacity is moderately reduced.  Diffusion capacity is moderately reduced when corrected for alveolar volume.   Overall comments: Compared to 2022 her FEV1 has improved from 50% predicted to 80% predicted. Her diffusion capacity has decreased.

## 2023-12-08 RX ORDER — ALBUTEROL SULFATE 90 UG/1
2 AEROSOL, METERED RESPIRATORY (INHALATION) EVERY 6 HOURS PRN
Qty: 18 G | Refills: 5 | Status: SHIPPED | OUTPATIENT
Start: 2023-12-08

## 2023-12-11 DIAGNOSIS — J45.30 MILD PERSISTENT ASTHMA WITHOUT COMPLICATION: ICD-10-CM

## 2023-12-11 DIAGNOSIS — R06.09 DYSPNEA ON EXERTION: ICD-10-CM

## 2023-12-11 DIAGNOSIS — J44.9 STAGE 2 MODERATE COPD BY GOLD CLASSIFICATION: Primary | ICD-10-CM

## 2023-12-11 NOTE — TELEPHONE ENCOUNTER
Rx Refill Note  Requested Prescriptions     Pending Prescriptions Disp Refills    budesonide-formoterol (SYMBICORT) 160-4.5 MCG/ACT inhaler 1 each 5     Sig: Inhale 2 puffs 2 (Two) Times a Day.      Last office visit with prescribing clinician: 8/31/2023   Last telemedicine visit with prescribing clinician: Visit date not found   Next office visit with prescribing clinician: 3/6/2024                         Would you like a call back once the refill request has been completed: [] Yes [] No    If the office needs to give you a call back, can they leave a voicemail: [] Yes [] No    Adeline Rasmussen MA  12/11/23, 09:26 CST

## 2023-12-13 RX ORDER — BUDESONIDE AND FORMOTEROL FUMARATE DIHYDRATE 160; 4.5 UG/1; UG/1
2 AEROSOL RESPIRATORY (INHALATION)
Qty: 1 EACH | Refills: 5 | Status: SHIPPED | OUTPATIENT
Start: 2023-12-13

## 2024-03-04 NOTE — PROGRESS NOTES
" ELADIA Ivey  [facility]   Pulmonary and Critical Care  [address]  [place] [address]  Phone: [phone]  Fax: [phone]           Chief Complaint  COPD    Subjective    History of Present Illness     Wanda Victoria presents to [facility] PULMONARY & CRITICAL CARE MEDICINE   History of Present Illness  Ms. Victoria is a pleasant 68 year old female patient centrolobular emphysema, asthma, basilar fibrosis, aortic root dilation, hx of demi mountain spotted fever, lyme disease. She is on Symbicort and finds benefit. She uses a rescue inhaler once or so a week and finds benefit. She notes she tries not to use it.   She has cough that is productive of clear/ creamy sputum. When she is on the Symbicort she is able to expectorate it easier. She uses her nebulizer as needed and last use in some time. She denies fever, chills. She has been having chest pain on/ off since November with associated sweating.  She declines going to the hospital as she notes she is a DNR.  Patient also indicates that she has intermittent tender enlarged lymph node of the left side of her neck.  She has not discussed this with her PCP.              Objective   Vital Signs:   /82   Pulse 83   Ht 172.1 cm (67.75\")   Wt 65.3 kg (144 lb)   SpO2 94% Comment: RA  BMI 22.06 kg/m²     Physical Exam  Vitals reviewed.   Constitutional:       Appearance: Normal appearance.   Cardiovascular:      Rate and Rhythm: Normal rate and regular rhythm.   Pulmonary:      Effort: Pulmonary effort is normal.      Breath sounds: Normal breath sounds.   Neurological:      General: No focal deficit present.      Mental Status: She is alert and oriented to person, place, and time.   Psychiatric:         Mood and Affect: Mood normal.         Behavior: Behavior normal.          Result Review :  The following data was reviewed by: ELADIA Ivey on 03/06/2024:    My interpretation of imaging: "  no new  My interpretation of labs: no new     PFT Values          2023    13:15   Pre Drug PFT Results   FVC 87   FEV1 80   FEF 25-75% 71   FEV1/FVC 70   Other Tests PFT Results   DLCO 43   D/VAsb 59     My interpretation of the PFT : no new    Results for orders placed in visit on 23    Pulmonary Function Test    Narrative  Pulmonary Function Test  Performed by: Sammi Abel, COLT  Authorized by: Setff Walter APRN    Pre Drug % Predicted  FVC: 87%  FEV1: 80%  FEF 25-75%: 71%  FEV1/FVC: 70%  DLCO: 43%  D/VAsb: 59%    Interpretation  Spirometry  Spirometry shows mild obstruction.  Review of FVL curve  There is a flattening of the inspiratory curve, suggesting variable extrathoracic obstruction.  Diffusion Capacity  The patient's diffusion capacity is moderately reduced.  Diffusion capacity is moderately reduced when corrected for alveolar volume.  Overall comments: Compared to  her FEV1 has improved from 50% predicted to 80% predicted. Her diffusion capacity has decreased.      Results for orders placed in visit on 22    Pulmonary Function Test    Narrative  Pulmonary Function Test  Performed by: Sammi Abel, COLT  Authorized by: Steff Walter APRN    Pre Drug % Predicted  FVC: 63%  FEV1: 50%  FEF 25-75%: 26%  FEV1/FVC: 62.23%  T%  RV: 131%  DLCO: 67%  D/VAsb: 71%    Interpretation  Spirometry  Spirometry shows moderate obstruction. There is reduced midflow suggesting small airway/airflow obstruction.  Review of FVL curve  Patient's effort is reduced.  Lung Volume Measurements  Measurements show elevated residual volume consistent with gas trapping.  Diffusion Capacity  The patient's diffusion capacity is moderately reduced.  Diffusion capacity is moderately reduced when corrected for alveolar volume.            Assessment and Plan   Diagnoses and all orders for this visit:    1. Stage 2 moderate COPD by GOLD classification (Primary)    2. Centrilobular  emphysema    3. History of asthma    4. Non-smoker      Continue Symbicort and as needed albuterol.  She does note that she is worse when she is not routinely taking her Symbicort.  She had concerns about intermittent left-sided enlarged lymph node of her neck.  None appreciated on palpation today however right-sided enlarged lymph node palpated.  She has not discussed this with her primary care physician.  She is encouraged to discuss this with him if it continues to occur.  She has no signs or symptoms of infectious process.  I have asked her to follow-up in 6 months with a flow-volume loop and DLCO in the office.  She did mention intermittent chest pain with associated diaphoresis for which she declines workup stating I am a DNR.      Follow Up   Return in about 6 months (around 9/6/2024) for FVL w DIF.  Patient was given instructions and counseling regarding her condition or for health maintenance advice. Please see specific information pulled into the AVS if appropriate.     Steff Walter, APRN  3/6/2024  13:59 CST

## 2024-03-06 ENCOUNTER — OFFICE VISIT (OUTPATIENT)
Dept: PULMONOLOGY | Facility: CLINIC | Age: 69
End: 2024-03-06
Payer: MEDICARE

## 2024-03-06 VITALS
HEART RATE: 83 BPM | OXYGEN SATURATION: 94 % | DIASTOLIC BLOOD PRESSURE: 82 MMHG | BODY MASS INDEX: 21.82 KG/M2 | WEIGHT: 144 LBS | SYSTOLIC BLOOD PRESSURE: 122 MMHG | HEIGHT: 68 IN

## 2024-03-06 DIAGNOSIS — Z78.9 NON-SMOKER: ICD-10-CM

## 2024-03-06 DIAGNOSIS — J44.9 STAGE 2 MODERATE COPD BY GOLD CLASSIFICATION: Primary | Chronic | ICD-10-CM

## 2024-03-06 DIAGNOSIS — J43.2 CENTRILOBULAR EMPHYSEMA: Chronic | ICD-10-CM

## 2024-03-06 DIAGNOSIS — Z87.09 HISTORY OF ASTHMA: ICD-10-CM

## 2024-03-06 RX ORDER — PREGABALIN 25 MG/1
25 CAPSULE ORAL
COMMUNITY
Start: 2023-11-20

## 2024-09-05 NOTE — PROGRESS NOTES
" ELADIA Ivey  [facility]   Pulmonary and Critical Care  [address]  [place] [address]  Phone: [phone]  Fax: [phone]           Chief Complaint  COPD (Had pneumonia in June)    Subjective    History of Present Illness     Wanda Victoria presents to [facility] PULMONARY & CRITICAL CARE MEDICINE     Ms. Victoria is a pleasant 69 year old female patient centrolobular emphysema, asthma, basilar fibrosis, aortic root dilation, hx of demi mountain spotted fever, lyme disease. She notes per her own judgement she had pneumonia in June and took some amoxicillin she had left over at home for a 10 day course. She did her breathing treatments as well. She started with son having bronchitis from work and then her  got sick followed by here. She had fever, chills and night sweats that have resolved. She has had persistent fatigue. She notes she is bruising worse now and known blood disorders. She is also under a lot of stress. 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 She could walk the 10 acre spot prior without having to stop. She notes her  had to have surgery for renal cancer a week ago. Her sone has been treated for the last 2 years for a blood disorder as well. She also notes the [employer] has taken an acre of her land. She has Symbicort she finds benefit.  She uses the Albuterol HFA 2-3 times a day since being sick and the heat. She has been suing the Nebulizer every day to twice a day in June. She uses it more when she is outside a lot. She notes her Oxygen sat has been 94-96%.     Objective   Vital Signs:   /88   Pulse 78   Ht 172.1 cm (67.75\")   Wt 64.2 kg (141 lb 9.6 oz)   SpO2 94%   BMI 21.69 kg/m²     Physical Exam  Vitals reviewed.   Constitutional:       Appearance: Normal appearance.   Cardiovascular:      Rate and Rhythm: Normal rate and regular rhythm. "   Pulmonary:      Effort: Pulmonary effort is normal.      Breath sounds: Normal breath sounds.   Neurological:      General: No focal deficit present.      Mental Status: She is alert and oriented to person, place, and time.   Psychiatric:         Mood and Affect: Mood normal.         Behavior: Behavior normal.            Result Review :  The following data was reviewed by: ELADIA Ivey on 2024:    My interpretation of imaging:  no new   My interpretation of labs: no new     PFT Values          2023    13:15   Pre Drug PFT Results   FVC 87   FEV1 80   FEF 25-75% 71   FEV1/FVC 70   Other Tests PFT Results   DLCO 43   D/VAsb 59     My interpretation of the PFT : not done today     Results for orders placed in visit on 23    Pulmonary Function Test    Narrative  Pulmonary Function Test  Performed by: Sammi Abel RRT  Authorized by: Steff Walter APRN    Pre Drug % Predicted  FVC: 87%  FEV1: 80%  FEF 25-75%: 71%  FEV1/FVC: 70%  DLCO: 43%  D/VAsb: 59%    Interpretation  Spirometry  Spirometry shows mild obstruction.  Review of FVL curve  There is a flattening of the inspiratory curve, suggesting variable extrathoracic obstruction.  Diffusion Capacity  The patient's diffusion capacity is moderately reduced.  Diffusion capacity is moderately reduced when corrected for alveolar volume.  Overall comments: Compared to  her FEV1 has improved from 50% predicted to 80% predicted. Her diffusion capacity has decreased.      Results for orders placed in visit on 22    Pulmonary Function Test    Narrative  Pulmonary Function Test  Performed by: Sammi Abel, COLT  Authorized by: Steff Walter APRN    Pre Drug % Predicted  FVC: 63%  FEV1: 50%  FEF 25-75%: 26%  FEV1/FVC: 62.23%  T%  RV: 131%  DLCO: 67%  D/VAsb: 71%    Interpretation  Spirometry  Spirometry shows moderate obstruction. There is reduced midflow suggesting small airway/airflow  obstruction.  Review of FVL curve  Patient's effort is reduced.  Lung Volume Measurements  Measurements show elevated residual volume consistent with gas trapping.  Diffusion Capacity  The patient's diffusion capacity is moderately reduced.  Diffusion capacity is moderately reduced when corrected for alveolar volume.          Assessment and Plan   Diagnoses and all orders for this visit:    1. PERERA (dyspnea on exertion) (Primary)  -     XR Chest 2 View; Future    2. Centrilobular emphysema    3. Stage 2 moderate COPD by GOLD classification    4. History of asthma    5. Non-smoker      Overall she is feeling improved from being sick in June.  She did have sick contacts at the time with her son and .  Nobody was viral tested at that time nor did she go to her primary care or urgent care.  She self treated with a leftover 10-day course of antibiotics she had at home.  She does however have some persistent fatigue and shortness of breath.  She will continue to use her Symbicort and as needed albuterol nebulizer or HFA.  She is agreeable to have a chest x-ray done in the next day or 2.  Her PFT was not done today given her persistent symptoms.  If her chest x-ray shows no acute process then we will have her return in 6 weeks with a flow-volume loop and diffusion capacity.  We also discussed the possibility of needing to change her from Symbicort to a different maintenance inhaler depending on the findings from her pulmonary function study.  She is agreeable with the plan.    Alpha 1: Normal, MM   LDCT: Never smoker   Smoking Cessation: Never smoker          Follow Up   Return in about 6 weeks (around 10/23/2024) for FVL w DIF.  Patient was given instructions and counseling regarding her condition or for health maintenance advice. Please see specific information pulled into the AVS if appropriate.     Steff Walter, ELADIA  9/11/2024  12:21 CDT    Please note that portions of this note were completed with a  voice recognition program.

## 2024-09-11 ENCOUNTER — PROCEDURE VISIT (OUTPATIENT)
Dept: PULMONOLOGY | Facility: CLINIC | Age: 69
End: 2024-09-11
Payer: MEDICARE

## 2024-09-11 ENCOUNTER — OFFICE VISIT (OUTPATIENT)
Dept: PULMONOLOGY | Facility: CLINIC | Age: 69
End: 2024-09-11
Payer: MEDICARE

## 2024-09-11 VITALS
BODY MASS INDEX: 21.46 KG/M2 | OXYGEN SATURATION: 94 % | DIASTOLIC BLOOD PRESSURE: 88 MMHG | HEART RATE: 78 BPM | WEIGHT: 141.6 LBS | HEIGHT: 68 IN | SYSTOLIC BLOOD PRESSURE: 132 MMHG

## 2024-09-11 DIAGNOSIS — R06.09 DOE (DYSPNEA ON EXERTION): Primary | ICD-10-CM

## 2024-09-11 DIAGNOSIS — J44.9 STAGE 2 MODERATE COPD BY GOLD CLASSIFICATION: Primary | ICD-10-CM

## 2024-09-11 DIAGNOSIS — Z78.9 NON-SMOKER: ICD-10-CM

## 2024-09-11 DIAGNOSIS — J44.9 STAGE 2 MODERATE COPD BY GOLD CLASSIFICATION: Chronic | ICD-10-CM

## 2024-09-11 DIAGNOSIS — J43.2 CENTRILOBULAR EMPHYSEMA: Chronic | ICD-10-CM

## 2024-09-11 DIAGNOSIS — Z87.09 HISTORY OF ASTHMA: ICD-10-CM

## 2024-09-11 PROCEDURE — 99214 OFFICE O/P EST MOD 30 MIN: CPT | Performed by: NURSE PRACTITIONER

## 2024-09-11 PROCEDURE — 3079F DIAST BP 80-89 MM HG: CPT | Performed by: NURSE PRACTITIONER

## 2024-09-11 PROCEDURE — 1159F MED LIST DOCD IN RCRD: CPT | Performed by: NURSE PRACTITIONER

## 2024-09-11 PROCEDURE — 3075F SYST BP GE 130 - 139MM HG: CPT | Performed by: NURSE PRACTITIONER

## 2024-09-11 PROCEDURE — 1160F RVW MEDS BY RX/DR IN RCRD: CPT | Performed by: NURSE PRACTITIONER

## 2024-09-11 RX ORDER — VARENICLINE 0.03 MG/.05ML
1 SPRAY NASAL AS NEEDED
COMMUNITY

## 2024-09-11 RX ORDER — DICLOFENAC SODIUM 75 MG/1
75 TABLET, DELAYED RELEASE ORAL 2 TIMES DAILY
COMMUNITY

## 2024-09-13 ENCOUNTER — TELEPHONE (OUTPATIENT)
Dept: PULMONOLOGY | Facility: CLINIC | Age: 69
End: 2024-09-13
Payer: MEDICARE

## 2024-09-13 DIAGNOSIS — R06.09 DOE (DYSPNEA ON EXERTION): ICD-10-CM

## 2024-09-13 NOTE — TELEPHONE ENCOUNTER
----- Message from Steff Walter sent at 9/13/2024  4:29 PM CDT -----  Please let patient know per report no acute process on her chest xray. Keep follow up

## 2024-10-23 PROBLEM — R91.8 GROUND GLASS OPACITY PRESENT ON IMAGING OF LUNG: Status: RESOLVED | Noted: 2020-08-01 | Resolved: 2024-10-23

## 2024-10-23 PROBLEM — R06.02 SHORTNESS OF BREATH: Status: RESOLVED | Noted: 2020-08-01 | Resolved: 2024-10-23

## 2024-10-23 NOTE — PROGRESS NOTES
" ELADIA Ivey  [facility]   Pulmonary and Critical Care  [address] [place] [address]  Phone: [phone]  Fax: [phone]           Chief Complaint  PERERA (dyspnea on exertion)    Subjective    History of Present Illness     Wanda Victoria presents to [facility] PULMONARY & CRITICAL CARE MEDICINE   Ms. Victoria is a pleasant 69 year old female patient  with centrolobular emphysema, asthma, basilar fibrosis, aortic root dilation, hx of demi mountain spotted fever, lyme disease, mixed connective tissue disease.     She is using Symbicort &albuterol HFA as well as albuterol nebulizer.  She currently finds benefit in this regimen.  She does note however that they are putting the Levie in by her house and there is a lot of dust and dirt being started up.  Her follow-up chest x-ray showed no acute process but hyperexpanded lungs.    Her pulmonary function study today showed mild restriction compared to August 2023 that showed mild obstruction.  Diffusion capacity remains decreased and stable.    Objective   Vital Signs:   /74   Pulse 80   Ht 172.1 cm (67.75\")   Wt 64.1 kg (141 lb 6.4 oz)   SpO2 96% Comment: R/A  BMI 21.66 kg/m²     Physical Exam  Vitals reviewed.   Constitutional:       Appearance: Normal appearance.   Cardiovascular:      Rate and Rhythm: Normal rate and regular rhythm.   Pulmonary:      Effort: Pulmonary effort is normal.      Breath sounds: Normal breath sounds.   Neurological:      General: No focal deficit present.      Mental Status: She is alert and oriented to person, place, and time.   Psychiatric:         Mood and Affect: Mood normal.         Behavior: Behavior normal.             Result Review :  The following data was reviewed by: ELADIA Ivey on 10/24/2024:     My interpretation of imaging: No new  My interpretation of labs: No New    PFT Values          8/31/2023    13:15 10/24/2024    11:30   Pre Drug PFT " Results   FVC 87 82   FEV1 80 81   FEF 25-75% 71 76   FEV1/FVC 70 76   Other Tests PFT Results   DLCO 43 51   D/VAsb 59 59     My interpretation of the PFT : As below    Results for orders placed in visit on 10/24/24    Spirometry with Diffusion Capacity    Narrative  Spirometry with Diffusion Capacity    Performed by: Sammi Abel, RRT  Authorized by: Steff Walter APRN  Pre Drug % Predicted  FVC: 82%  FEV1: 81%  FEF 25-75%: 76%  FEV1/FVC: 76%  DLCO: 51%  D/VAsb: 59%    Interpretation  Spirometry  Spirometry shows mild restriction. There is reduced midflow suggesting small airway/airflow obstruction.  Review of FVL curve  Patient's effort is normal.  Diffusion Capacity  The patient's diffusion capacity is moderately reduced.  Diffusion capacity is moderately reduced when corrected for alveolar volume.  Overall comments: Compared to August 2023 her FEV1 FVC ratio improved from 70 to 76% predicted, FEV1 is stable at 81% predicted, FVC dropped from 8782% predicted.  Diffusion capacity when corrected for alveolar volume is stable at 59% predicted.      Results for orders placed in visit on 08/31/23    Pulmonary Function Test    Narrative  Pulmonary Function Test  Performed by: Sammi Abel, COLT  Authorized by: Steff Walter APRN    Pre Drug % Predicted  FVC: 87%  FEV1: 80%  FEF 25-75%: 71%  FEV1/FVC: 70%  DLCO: 43%  D/VAsb: 59%    Interpretation  Spirometry  Spirometry shows mild obstruction.  Review of FVL curve  There is a flattening of the inspiratory curve, suggesting variable extrathoracic obstruction.  Diffusion Capacity  The patient's diffusion capacity is moderately reduced.  Diffusion capacity is moderately reduced when corrected for alveolar volume.  Overall comments: Compared to 2022 her FEV1 has improved from 50% predicted to 80% predicted. Her diffusion capacity has decreased.      Results for orders placed in visit on 01/05/22    Pulmonary Function  Test    Narrative  Pulmonary Function Test  Performed by: Sammi Abel, RRT  Authorized by: Steff Walter APRN    Pre Drug % Predicted  FVC: 63%  FEV1: 50%  FEF 25-75%: 26%  FEV1/FVC: 62.23%  T%  RV: 131%  DLCO: 67%  D/VAsb: 71%    Interpretation  Spirometry  Spirometry shows moderate obstruction. There is reduced midflow suggesting small airway/airflow obstruction.  Review of FVL curve  Patient's effort is reduced.  Lung Volume Measurements  Measurements show elevated residual volume consistent with gas trapping.  Diffusion Capacity  The patient's diffusion capacity is moderately reduced.  Diffusion capacity is moderately reduced when corrected for alveolar volume.          Assessment and Plan   Diagnoses and all orders for this visit:    1. Stage 2 moderate COPD by GOLD classification (Primary)  Assessment & Plan:  Continue Symbicort and as needed albuterol HFA or nebulizer.  She will contact our office should she develop worsening symptoms at which time we will see if we have samples of Breztri or Trelegy to try.  Reviewed and discussed her PFT today which shows her to have mild restriction.  She does note a history of mixed connective tissue disease.  Will continue to monitor at this time.  Chest x-ray showed no signs of fibrosis. We discussed we could check a HRCT in the future however with mild restriction this would not change treatment at this time.     Orders:  -     budesonide-formoterol (SYMBICORT) 160-4.5 MCG/ACT inhaler; Inhale 2 puffs 2 (Two) Times a Day.  Dispense: 1 each; Refill: 5    2. History of asthma    3. Centrilobular emphysema    4. Dyspnea on exertion  -     budesonide-formoterol (SYMBICORT) 160-4.5 MCG/ACT inhaler; Inhale 2 puffs 2 (Two) Times a Day.  Dispense: 1 each; Refill: 5    5. Mild persistent asthma without complication  -     budesonide-formoterol (SYMBICORT) 160-4.5 MCG/ACT inhaler; Inhale 2 puffs 2 (Two) Times a Day.  Dispense: 1 each; Refill: 5    Other  orders  -     Fluzone High-Dose 65+yrs  -     Pneumococcal Conjugate Vaccine 20-Valent All          Alpha 1: Normal, MM   LDCT: Never smoker   Smoking Cessation: Never smoke  Vaccinations: Flu: Given today PNA:  Given today RSV: Completed         Follow Up   Return in about 6 months (around 4/24/2025).  Patient was given instructions and counseling regarding her condition or for health maintenance advice. Please see specific information pulled into the AVS if appropriate.     Steff Walter, ELADIA  10/24/2024  11:49 CDT    Please note that portions of this note were completed with a voice recognition program.

## 2024-10-24 ENCOUNTER — OFFICE VISIT (OUTPATIENT)
Dept: PULMONOLOGY | Facility: CLINIC | Age: 69
End: 2024-10-24
Payer: MEDICARE

## 2024-10-24 ENCOUNTER — PROCEDURE VISIT (OUTPATIENT)
Dept: PULMONOLOGY | Facility: CLINIC | Age: 69
End: 2024-10-24
Payer: MEDICARE

## 2024-10-24 VITALS
WEIGHT: 141.4 LBS | BODY MASS INDEX: 21.43 KG/M2 | OXYGEN SATURATION: 96 % | DIASTOLIC BLOOD PRESSURE: 74 MMHG | HEIGHT: 68 IN | SYSTOLIC BLOOD PRESSURE: 115 MMHG | HEART RATE: 80 BPM

## 2024-10-24 DIAGNOSIS — J43.2 CENTRILOBULAR EMPHYSEMA: Chronic | ICD-10-CM

## 2024-10-24 DIAGNOSIS — R06.09 DOE (DYSPNEA ON EXERTION): ICD-10-CM

## 2024-10-24 DIAGNOSIS — J44.9 STAGE 2 MODERATE COPD BY GOLD CLASSIFICATION: Primary | Chronic | ICD-10-CM

## 2024-10-24 DIAGNOSIS — Z87.09 HISTORY OF ASTHMA: ICD-10-CM

## 2024-10-24 DIAGNOSIS — J45.30 MILD PERSISTENT ASTHMA WITHOUT COMPLICATION: ICD-10-CM

## 2024-10-24 DIAGNOSIS — R06.09 DYSPNEA ON EXERTION: ICD-10-CM

## 2024-10-24 DIAGNOSIS — J44.9 STAGE 2 MODERATE COPD BY GOLD CLASSIFICATION: Chronic | ICD-10-CM

## 2024-10-24 RX ORDER — BUDESONIDE AND FORMOTEROL FUMARATE DIHYDRATE 160; 4.5 UG/1; UG/1
2 AEROSOL RESPIRATORY (INHALATION)
Qty: 1 EACH | Refills: 5 | Status: SHIPPED | OUTPATIENT
Start: 2024-10-24

## 2024-10-24 NOTE — PROCEDURES
Spirometry with Diffusion Capacity    Performed by: Sammi Abel, RRT  Authorized by: Steff Walter APRN     Pre Drug % Predicted    FVC: 82%   FEV1: 81%   FEF 25-75%: 76%   FEV1/FVC: 76%   DLCO: 51%   D/VAsb: 59%    Interpretation   Spirometry   Spirometry shows mild restriction. There is reduced midflow suggesting small airway/airflow obstruction.   Review of FVL curve   Patient's effort is normal.   Diffusion Capacity  The patient's diffusion capacity is moderately reduced.  Diffusion capacity is moderately reduced when corrected for alveolar volume.   Overall comments: Compared to August 2023 her FEV1 FVC ratio improved from 70 to 76% predicted, FEV1 is stable at 81% predicted, FVC dropped from 8782% predicted.  Diffusion capacity when corrected for alveolar volume is stable at 59% predicted.

## 2024-10-24 NOTE — ASSESSMENT & PLAN NOTE
Continue Symbicort and as needed albuterol HFA or nebulizer.  She will contact our office should she develop worsening symptoms at which time we will see if we have samples of Breztri or Trelegy to try.  Reviewed and discussed her PFT today which shows her to have mild restriction.  She does note a history of mixed connective tissue disease.  Will continue to monitor at this time.  Chest x-ray showed no signs of fibrosis. We discussed we could check a HRCT in the future however with mild restriction this would not change treatment at this time.

## 2024-10-29 ENCOUNTER — TELEPHONE (OUTPATIENT)
Dept: PULMONOLOGY | Facility: CLINIC | Age: 69
End: 2024-10-29
Payer: MEDICARE

## 2024-10-29 NOTE — TELEPHONE ENCOUNTER
Received voicemail from patient wanting to let Monica know that she spoke to  about ordering a CT scan. She said that Dr. Subramanian thinks it is a good idea, he requests that she order it and specify what kind of CT and what they are looking for.

## 2024-10-30 NOTE — TELEPHONE ENCOUNTER
Called and spoke to patient, she said that she would like to wait and just get it done before her visit in April.

## 2025-04-22 ENCOUNTER — HOSPITAL ENCOUNTER (OUTPATIENT)
Dept: CT IMAGING | Facility: HOSPITAL | Age: 70
Discharge: HOME | End: 2025-04-22
Admitting: NURSE PRACTITIONER
Payer: MEDICARE

## 2025-04-22 DIAGNOSIS — J98.4 RESTRICTIVE LUNG DISEASE: ICD-10-CM

## 2025-04-22 DIAGNOSIS — R06.09 DYSPNEA ON EXERTION: ICD-10-CM

## 2025-04-22 PROCEDURE — 71250 CT THORAX DX C-: CPT

## 2025-04-23 NOTE — PROGRESS NOTES
" ELADIA Ivey  [facility]   Pulmonary and Critical Care  [address] [place] [address]  Phone: [phone]  Fax: [phone]           Chief Complaint  Stage 2 moderate COPD    Subjective        Wandag[place] Victoria presents to [facility] PULMONARY & CRITICAL CARE MEDICINE        History of Present Illness  Ms. Victoria is a pleasant 69 year old female patient  with centrolobular emphysema, asthma, basilar fibrosis, aortic root dilation, hx of demi mountain spotted fever, lyme disease, mixed connective tissue disease.     She reports no new health concerns or complaints at this time. She is currently on Symbicort, albuterol, and utilizes a nebulizer, which she believes are effectively managing her condition.    She has a known diagnosis of aortic root dilation but has chosen not to pursue surgical intervention or further surveillance. She was previously under the care of Dr. Subramanian, who referred her to the Marfan's clinic in [place]. At the clinic, she was informed that she met some criteria for Marfan's syndrome and was recommended to undergo genetic testing. She also has a history of floppy valves. Her son has T [place] clinic seemed a little more interested in her son at the time. She has a family history of aneurysms, with several relatives having been diagnosed in their late 80s.    High-res CT did not show any evidence of ILD.  Did show mild to moderate patchy bilateral air-trapping and dilated ascending thoracic aorta of 4.3 cm-known to patient.      Objective   Vital Signs:   /80   Pulse 67   Ht 172.1 cm (67.75\")   Wt 62.1 kg (137 lb)   SpO2 99% Comment: RA  BMI 20.98 kg/m²     Physical Exam  Vitals reviewed.   Constitutional:       Appearance: Normal appearance.   Cardiovascular:      Rate and Rhythm: Normal rate and regular rhythm.   Pulmonary:      Effort: Pulmonary effort is normal.      Breath sounds: Normal breath sounds. "   Neurological:      General: No focal deficit present.      Mental Status: She is alert and oriented to person, place, and time.   Psychiatric:         Mood and Affect: Mood normal.         Behavior: Behavior normal.             Result Review :  The following data was reviewed by: ELADIA Ivey on 04/24/2025:    Data reviewed : Radiologic studies high-res CT    My interpretation of imaging: No evidence of diffuse interstitial lung disease, mild to moderate patchy bilateral air-trapping, descending aorta dilated at 4.3 cm  My interpretation of labs: None  CT Chest Hi Resolution Diagnostic (04/22/2025 10:19)   PFT Values          8/31/2023    13:15 10/24/2024    11:30   Pre Drug PFT Results   FVC 87 82   FEV1 80 81   FEF 25-75% 71 76   FEV1/FVC 70 76   Other Tests PFT Results   DLCO 43 51   D/VAsb 59 59     My interpretation of the PFT : No new    Results for orders placed in visit on 10/24/24    Spirometry with Diffusion Capacity    Narrative  Spirometry with Diffusion Capacity    Performed by: Sammi Abel, RRT  Authorized by: Steff Walter APRN  Pre Drug % Predicted  FVC: 82%  FEV1: 81%  FEF 25-75%: 76%  FEV1/FVC: 76%  DLCO: 51%  D/VAsb: 59%    Interpretation  Spirometry  Spirometry shows mild restriction. There is reduced midflow suggesting small airway/airflow obstruction.  Review of FVL curve  Patient's effort is normal.  Diffusion Capacity  The patient's diffusion capacity is moderately reduced.  Diffusion capacity is moderately reduced when corrected for alveolar volume.  Overall comments: Compared to August 2023 her FEV1 FVC ratio improved from 70 to 76% predicted, FEV1 is stable at 81% predicted, FVC dropped from 8782% predicted.  Diffusion capacity when corrected for alveolar volume is stable at 59% predicted.      Results for orders placed in visit on 08/31/23    Pulmonary Function Test    Narrative  Pulmonary Function Test  Performed by: Sammi Abel,  RRT  Authorized by: Steff Walter APRN    Pre Drug % Predicted  FVC: 87%  FEV1: 80%  FEF 25-75%: 71%  FEV1/FVC: 70%  DLCO: 43%  D/VAsb: 59%    Interpretation  Spirometry  Spirometry shows mild obstruction.  Review of FVL curve  There is a flattening of the inspiratory curve, suggesting variable extrathoracic obstruction.  Diffusion Capacity  The patient's diffusion capacity is moderately reduced.  Diffusion capacity is moderately reduced when corrected for alveolar volume.  Overall comments: Compared to  her FEV1 has improved from 50% predicted to 80% predicted. Her diffusion capacity has decreased.      Results for orders placed in visit on 22    Pulmonary Function Test    Narrative  Pulmonary Function Test  Performed by: Sammi Abel, RRT  Authorized by: Steff Walter APRN    Pre Drug % Predicted  FVC: 63%  FEV1: 50%  FEF 25-75%: 26%  FEV1/FVC: 62.23%  T%  RV: 131%  DLCO: 67%  D/VAsb: 71%    Interpretation  Spirometry  Spirometry shows moderate obstruction. There is reduced midflow suggesting small airway/airflow obstruction.  Review of FVL curve  Patient's effort is reduced.  Lung Volume Measurements  Measurements show elevated residual volume consistent with gas trapping.  Diffusion Capacity  The patient's diffusion capacity is moderately reduced.  Diffusion capacity is moderately reduced when corrected for alveolar volume.          Assessment and Plan   Diagnoses and all orders for this visit:    1. Stage 2 moderate COPD by GOLD classification (Primary)    2. Centrilobular emphysema    3. History of asthma      Reviewed and discussed her high-res CT results.  She is not wish to be referred to CT surgery for surveillance of her aortic dilation.  She states she has been to the Marfan clinic in [place] in the past and has had surveillance in the past and does not wish to pursue any surveillance or intervention at this time.  She will continue her current  bronchodilators as she finds benefit.  I have asked her to return in 6 months.           Alpha 1: Normal, MM   LDCT: Never smoker   Smoking Cessation: Never smoke  Vaccinations: Flu: Due fall 2025     PNA:   Given today   RSV: Completed      Follow Up   No follow-ups on file.  Patient was given instructions and counseling regarding her condition or for health maintenance advice. Please see specific information pulled into the AVS if appropriate.     ELADIA Ivey  4/24/2025  11:51 CDT    Please note that portions of this note were completed with a voice recognition program.    Patient or patient representative verbalized consent for the use of Ambient Listening during the visit with  ELADIA Ivey for chart documentation. 4/24/2025  14:10 CDT

## 2025-04-24 ENCOUNTER — OFFICE VISIT (OUTPATIENT)
Dept: PULMONOLOGY | Facility: CLINIC | Age: 70
End: 2025-04-24
Payer: MEDICARE

## 2025-04-24 VITALS
SYSTOLIC BLOOD PRESSURE: 134 MMHG | HEART RATE: 67 BPM | BODY MASS INDEX: 20.76 KG/M2 | DIASTOLIC BLOOD PRESSURE: 80 MMHG | HEIGHT: 68 IN | WEIGHT: 137 LBS | OXYGEN SATURATION: 99 %

## 2025-04-24 DIAGNOSIS — J44.9 STAGE 2 MODERATE COPD BY GOLD CLASSIFICATION: Primary | Chronic | ICD-10-CM

## 2025-04-24 DIAGNOSIS — Z87.09 HISTORY OF ASTHMA: ICD-10-CM

## 2025-04-24 DIAGNOSIS — J43.2 CENTRILOBULAR EMPHYSEMA: Chronic | ICD-10-CM

## 2025-04-24 RX ORDER — FLUTICASONE FUROATE, UMECLIDINIUM BROMIDE AND VILANTEROL TRIFENATATE 100; 62.5; 25 UG/1; UG/1; UG/1
1 POWDER RESPIRATORY (INHALATION)
Qty: 3 EACH | Refills: 0 | Status: CANCELLED | COMMUNITY
Start: 2025-04-24

## 2025-05-30 ENCOUNTER — OFFICE VISIT (OUTPATIENT)
Dept: INTERNAL MEDICINE | Facility: CLINIC | Age: 70
End: 2025-05-30
Payer: MEDICARE

## 2025-05-30 ENCOUNTER — HOSPITAL ENCOUNTER (OUTPATIENT)
Dept: ULTRASOUND IMAGING | Facility: HOSPITAL | Age: 70
Discharge: HOME | End: 2025-05-30
Payer: MEDICARE

## 2025-05-30 VITALS
OXYGEN SATURATION: 97 % | BODY MASS INDEX: 20.92 KG/M2 | HEART RATE: 93 BPM | HEIGHT: 68 IN | DIASTOLIC BLOOD PRESSURE: 82 MMHG | SYSTOLIC BLOOD PRESSURE: 140 MMHG | WEIGHT: 138 LBS | TEMPERATURE: 96.4 F

## 2025-05-30 DIAGNOSIS — M35.1 MIXED CONNECTIVE TISSUE DISEASE: ICD-10-CM

## 2025-05-30 DIAGNOSIS — I10 ESSENTIAL HYPERTENSION: ICD-10-CM

## 2025-05-30 DIAGNOSIS — I77.819 AORTIC DILATATION: ICD-10-CM

## 2025-05-30 DIAGNOSIS — R09.89 PULMONARY AIR TRAPPING: ICD-10-CM

## 2025-05-30 DIAGNOSIS — Z11.59 ENCOUNTER FOR HEPATITIS C SCREENING TEST FOR LOW RISK PATIENT: ICD-10-CM

## 2025-05-30 DIAGNOSIS — R59.0 ANTERIOR CERVICAL ADENOPATHY: ICD-10-CM

## 2025-05-30 DIAGNOSIS — M15.0 PRIMARY OSTEOARTHRITIS INVOLVING MULTIPLE JOINTS: ICD-10-CM

## 2025-05-30 DIAGNOSIS — Z76.89 ENCOUNTER TO ESTABLISH CARE WITH NEW DOCTOR: Primary | ICD-10-CM

## 2025-05-30 DIAGNOSIS — R79.9 ABNORMAL FINDING OF BLOOD CHEMISTRY: ICD-10-CM

## 2025-05-30 PROCEDURE — 76536 US EXAM OF HEAD AND NECK: CPT

## 2025-05-30 RX ORDER — PREGABALIN 25 MG/1
25 CAPSULE ORAL AS NEEDED
Qty: 30 CAPSULE | Refills: 1 | Status: SHIPPED | OUTPATIENT
Start: 2025-05-30

## 2025-05-30 RX ORDER — CYCLOBENZAPRINE HCL 10 MG
10 TABLET ORAL 3 TIMES DAILY PRN
Qty: 90 TABLET | Refills: 1 | Status: SHIPPED | OUTPATIENT
Start: 2025-05-30

## 2025-05-30 RX ORDER — HYDROCHLOROTHIAZIDE 12.5 MG/1
12.5 CAPSULE ORAL AS NEEDED
Qty: 30 CAPSULE | Refills: 1 | Status: SHIPPED | OUTPATIENT
Start: 2025-05-30

## 2025-05-30 NOTE — PROGRESS NOTES
"    Chief Complaint  Establish Care (Former patient of Dr. Morton at [facility] [place] [place]  Referred to our office by ELADIA Mason.  ) and Fatigue (Patient has had lyme disease, [place] Mountain fever and  Erhichiolosis. Requesting blood work.  )    Subjective        Wanda Victoria presents to [facility] PRIMARY CARE  Fatigue  Symptoms include fatigue.      See below.     Objective   Vital Signs:  /82   Pulse 93   Temp 96.4 °F (35.8 °C) (Temporal)   Ht 172.1 cm (67.75\")   Wt 62.6 kg (138 lb)   SpO2 97%   BMI 21.14 kg/m²   Estimated body mass index is 21.14 kg/m² as calculated from the following:    Height as of this encounter: 172.1 cm (67.75\").    Weight as of this encounter: 62.6 kg (138 lb).     BMI is within normal parameters. No other follow-up for BMI required.    Physical Exam  Constitutional:       Appearance: She is not ill-appearing.      Comments: Looks younger than her stated age.   HENT:      Head: Normocephalic and atraumatic.      Right Ear: Tympanic membrane and ear canal normal.      Left Ear: Tympanic membrane and ear canal normal.      Mouth/Throat:      Mouth: Mucous membranes are moist.      Pharynx: Oropharynx is clear.   Eyes:      Conjunctiva/sclera: Conjunctivae normal.      Pupils: Pupils are equal, round, and reactive to light.   Cardiovascular:      Rate and Rhythm: Normal rate and regular rhythm.      Heart sounds: Normal heart sounds.   Pulmonary:      Effort: Pulmonary effort is normal. No respiratory distress.      Breath sounds: Normal breath sounds.   Musculoskeletal:         General: No swelling.      Cervical back: Neck supple.   Lymphadenopathy:      Cervical: Cervical adenopathy (left anterior) present.   Skin:     General: Skin is warm and dry.      Findings: No rash.   Neurological:      General: No focal deficit present.      Mental Status: She is alert and oriented to person, place, and time.   Psychiatric:         " Mood and Affect: Mood normal.         Behavior: Behavior normal.         Thought Content: Thought content normal.         Judgment: Judgment normal.        Result Review :  CT CHEST HI RESOLUTION DIAGNOSTIC   IMPRESSION:  1. No evidence of diffuse interstitial lung disease.  2. Mild to moderate patchy bilateral air trapping.  3. Dilated ascending thoracic aorta (4.3 cm).  4. Cholelithiasis.  This report was signed and finalized on 4/22/2025 11:09 AM by Puneet Gonsalves.           Assessment and Plan   Diagnoses and all orders for this visit:    1. Encounter to establish care with new doctor (Primary)    2. Pulmonary air trapping    3. Mixed connective tissue disease    4. Aortic dilatation    5. Essential hypertension  -     CBC & Differential  -     Comprehensive metabolic panel  -     TSH Rfx On Abnormal To Free T4  -     hydroCHLOROthiazide (MICROZIDE) 12.5 MG capsule; Take 1 capsule by mouth As Needed (high blood pressure).  Dispense: 30 capsule; Refill: 1    6. Primary osteoarthritis involving multiple joints  -     pregabalin (LYRICA) 25 MG capsule; Take 1 capsule by mouth As Needed (pain).  Dispense: 30 capsule; Refill: 1  -     cyclobenzaprine (FLEXERIL) 10 MG tablet; Take 1 tablet by mouth 3 (Three) Times a Day As Needed for Muscle Spasms.  Dispense: 90 tablet; Refill: 1    7. Anterior cervical adenopathy  -     US Head Neck Soft Tissue; Future    8. Abnormal finding of blood chemistry  -     Lipid Panel    9. Encounter for hepatitis C screening test for low risk patient  -     Hepatitis C antibody       Presents today to establish care.  She has been a prior patient of Dr. Morton in [place], [place].  She lives in [place].  She was previously a nurse at [employer].  She was referred to our office by ELADIA Mason with pulmonary.    There were concerns that she might have pulmonary fibrosis previously.  Workup has not been consistent with this.  She does had a high-resolution CT in April that showed air  "trapping without evidence of interstitial lung disease.  This study also showed a known aortic dilatation at 4.3 cm.  There have been some concerns through the years that she might have Marfan's.  She was going to be worked up for this in [place], but the genetic testing was not covered by her insurance so she decided to forego the workup.  She has carried a generic diagnosis of \"mixed connective tissue disease\" for quite some time.  She does not wish to be referred to cardiothoracic surgery to follow the dilation.  She may end up having serial CT imaging with pulmonary and we can keep an eye on it in that way.    She takes hydrochlorothiazide as needed.  This is fairly unconventional.  She states that her blood pressure typically runs in the 110s over 60s.  It is up today, but this is an outlier compared to prior office blood pressures.  She states that her blood pressure is mainly affected at times by stress and also osteoarthritic pain.    She takes nonsteroidals at times for osteoarthritic pain.  She also uses an occasional low-dose of pregabalin.  She maybe takes this 2-3 times per week.  She asked for refill today and I did not have a problem with that.    She has a longstanding issue with osteoporosis.  She states that she has \"been on all the medications.\"  It sounds like she followed with Juan Flanagan PA-C at [facility].  We will get their last office note and bone mineral density.  She has also had previous back surgery with Dr. Ha.  She has seen other orthopods in that group for other issues as well.    She has previously seen Dr. Schroeder with infectious disease due to prior history of tickborne illness.    She has a palpable left anterior cervical lymph node.  She states that this changes in size at times.  She has not had this evaluated.  We will order an ultrasound.    She believes her last mammogram was at [place] around a year ago.  She is previously followed Dr. Mayes with the [facility] breast clinic.  " She has had biopsies previously.  She had a benign excision previously.  We will get records.    For gynecology care, she previously followed with Dr. Mccullough and Dr. Young.     She has never undergone colorectal cancer screening and does not wish to do so.    Plan have her back in 6 months for her annual Medicare wellness visit.  She knows that she can reach out sooner if problems.  I will update her on the ultrasound and labs as soon as results are known.     Follow Up   Return in about 6 months (around 11/30/2025) for Annual Medicare Wellness Visit.  Patient was given instructions and counseling regarding her condition or for health maintenance advice. Please see specific information pulled into the AVS if appropriate.      ABBIE Hernandez DO       Electronically signed by NADEEN Hernandez DO, 05/30/25, 11:16 AM CDT.

## 2025-05-31 LAB
ALBUMIN SERPL-MCNC: 4.6 G/DL (ref 3.5–5.2)
ALBUMIN/GLOB SERPL: 1.9 G/DL
ALP SERPL-CCNC: 96 U/L (ref 39–117)
ALT SERPL-CCNC: 21 U/L (ref 1–33)
AST SERPL-CCNC: 25 U/L (ref 1–32)
BASOPHILS # BLD AUTO: 0.08 10*3/MM3 (ref 0–0.2)
BASOPHILS NFR BLD AUTO: 1 % (ref 0–1.5)
BILIRUB SERPL-MCNC: 0.6 MG/DL (ref 0–1.2)
BUN SERPL-MCNC: 19 MG/DL (ref 8–23)
BUN/CREAT SERPL: 17.3 (ref 7–25)
CALCIUM SERPL-MCNC: 10.1 MG/DL (ref 8.6–10.5)
CHLORIDE SERPL-SCNC: 104 MMOL/L (ref 98–107)
CHOLEST SERPL-MCNC: 231 MG/DL (ref 0–200)
CO2 SERPL-SCNC: 25.7 MMOL/L (ref 22–29)
CREAT SERPL-MCNC: 1.1 MG/DL (ref 0.57–1)
EGFRCR SERPLBLD CKD-EPI 2021: 54.2 ML/MIN/1.73
EOSINOPHIL # BLD AUTO: 0.11 10*3/MM3 (ref 0–0.4)
EOSINOPHIL NFR BLD AUTO: 1.4 % (ref 0.3–6.2)
ERYTHROCYTE [DISTWIDTH] IN BLOOD BY AUTOMATED COUNT: 12.8 % (ref 12.3–15.4)
GLOBULIN SER CALC-MCNC: 2.4 GM/DL
GLUCOSE SERPL-MCNC: 88 MG/DL (ref 65–99)
HCT VFR BLD AUTO: 46.1 % (ref 34–46.6)
HCV IGG SERPL QL IA: NON REACTIVE
HDLC SERPL-MCNC: 62 MG/DL (ref 40–60)
HGB BLD-MCNC: 14.6 G/DL (ref 12–15.9)
IMM GRANULOCYTES # BLD AUTO: 0.03 10*3/MM3 (ref 0–0.05)
IMM GRANULOCYTES NFR BLD AUTO: 0.4 % (ref 0–0.5)
LDLC SERPL CALC-MCNC: 144 MG/DL (ref 0–100)
LYMPHOCYTES # BLD AUTO: 2.06 10*3/MM3 (ref 0.7–3.1)
LYMPHOCYTES NFR BLD AUTO: 25.8 % (ref 19.6–45.3)
MCH RBC QN AUTO: 27.8 PG (ref 26.6–33)
MCHC RBC AUTO-ENTMCNC: 31.7 G/DL (ref 31.5–35.7)
MCV RBC AUTO: 87.8 FL (ref 79–97)
MONOCYTES # BLD AUTO: 0.58 10*3/MM3 (ref 0.1–0.9)
MONOCYTES NFR BLD AUTO: 7.3 % (ref 5–12)
NEUTROPHILS # BLD AUTO: 5.13 10*3/MM3 (ref 1.7–7)
NEUTROPHILS NFR BLD AUTO: 64.1 % (ref 42.7–76)
NRBC BLD AUTO-RTO: 0 /100 WBC (ref 0–0.2)
PLATELET # BLD AUTO: 272 10*3/MM3 (ref 140–450)
POTASSIUM SERPL-SCNC: 5 MMOL/L (ref 3.5–5.2)
PROT SERPL-MCNC: 7 G/DL (ref 6–8.5)
RBC # BLD AUTO: 5.25 10*6/MM3 (ref 3.77–5.28)
SODIUM SERPL-SCNC: 140 MMOL/L (ref 136–145)
TRIGL SERPL-MCNC: 141 MG/DL (ref 0–150)
TSH SERPL DL<=0.005 MIU/L-ACNC: 1.4 UIU/ML (ref 0.27–4.2)
VLDLC SERPL CALC-MCNC: 25 MG/DL (ref 5–40)
WBC # BLD AUTO: 7.99 10*3/MM3 (ref 3.4–10.8)

## 2025-06-02 ENCOUNTER — PATIENT ROUNDING (BHMG ONLY) (OUTPATIENT)
Dept: INTERNAL MEDICINE | Facility: CLINIC | Age: 70
End: 2025-06-02
Payer: MEDICARE

## (undated) DEVICE — SYR LL TP 10ML STRL

## (undated) DEVICE — NDL HYPO PRECISIONGLIDE REG 22G 1 1/2

## (undated) DEVICE — NEEDLE, QUINCKE, 18GX3.5": Brand: MEDLINE

## (undated) DEVICE — BAPTIST TURNOVER KIT: Brand: MEDLINE INDUSTRIES, INC.

## (undated) DEVICE — GLV SURG BIOGEL LTX PF 7 1/2

## (undated) DEVICE — APPL CHLORAPREP HI/LITE TINTED 3ML ORNG

## (undated) DEVICE — BNDG ADHS CURAD FLX/FABRC 1X3IN STRL LF